# Patient Record
Sex: MALE | HISPANIC OR LATINO | Employment: FULL TIME | URBAN - METROPOLITAN AREA
[De-identification: names, ages, dates, MRNs, and addresses within clinical notes are randomized per-mention and may not be internally consistent; named-entity substitution may affect disease eponyms.]

---

## 2017-12-20 ENCOUNTER — HOSPITAL ENCOUNTER (EMERGENCY)
Facility: MEDICAL CENTER | Age: 37
End: 2017-12-20
Attending: EMERGENCY MEDICINE
Payer: COMMERCIAL

## 2017-12-20 VITALS
SYSTOLIC BLOOD PRESSURE: 132 MMHG | HEIGHT: 72 IN | OXYGEN SATURATION: 98 % | HEART RATE: 72 BPM | BODY MASS INDEX: 34.67 KG/M2 | WEIGHT: 255.95 LBS | RESPIRATION RATE: 16 BRPM | TEMPERATURE: 98.2 F | DIASTOLIC BLOOD PRESSURE: 70 MMHG

## 2017-12-20 DIAGNOSIS — S05.01XA ABRASION OF RIGHT CORNEA, INITIAL ENCOUNTER: ICD-10-CM

## 2017-12-20 PROCEDURE — 99283 EMERGENCY DEPT VISIT LOW MDM: CPT

## 2017-12-20 RX ORDER — POLYMYXIN B SULFATE AND TRIMETHOPRIM 1; 10000 MG/ML; [USP'U]/ML
1 SOLUTION OPHTHALMIC EVERY 4 HOURS
Qty: 10 ML | Refills: 1 | Status: SHIPPED | OUTPATIENT
Start: 2017-12-20 | End: 2017-12-23

## 2017-12-20 RX ORDER — MINERAL OIL, PETROLATUM 425; 568 MG/G; MG/G
OINTMENT OPHTHALMIC
Qty: 1 TUBE | Refills: 3 | Status: SHIPPED | OUTPATIENT
Start: 2017-12-20 | End: 2021-01-13

## 2017-12-20 ASSESSMENT — PAIN SCALES - GENERAL: PAINLEVEL_OUTOF10: 0

## 2017-12-20 NOTE — ED NOTES
Pt c/o R eye pain since yesterday. 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 Pt states since then it feels like something is in his eye. +photophobia. +redness/clear drainage noted to R eye.     A/ox4, speaking in full sentences.

## 2017-12-20 NOTE — DISCHARGE INSTRUCTIONS
Corneal Abrasion  The cornea is the clear covering at the front and center of the eye. When looking at the colored portion of the eye (iris), you are looking through the cornea. This very thin tissue is made up of many layers. The surface layer is a single layer of cells (corneal epithelium) and is one of the most sensitive tissues in the body. If a scratch or injury causes the corneal epithelium to come off, it is called a corneal abrasion. If the injury extends to the tissues below the epithelium, the condition is called a corneal ulcer.  CAUSES   · Scratches.  · Trauma.  · Foreign body in the eye.  Some people have recurrences of abrasions in the area of the original injury even after it has healed (recurrent erosion syndrome). Recurrent erosion syndrome generally improves and goes away with time.  SYMPTOMS   · Eye pain.  · Difficulty or inability to keep the injured eye open.  · The eye becomes very sensitive to light.  · Recurrent erosions tend to happen suddenly, first thing in the morning, usually after waking up and opening the eye.  DIAGNOSIS   Your health care provider can diagnose a corneal abrasion during an eye exam. Dye is usually placed in the eye using a drop or a small paper strip moistened by your tears. When the eye is examined with a special light, the abrasion shows up clearly because of the dye.  TREATMENT   · Small abrasions may be treated with antibiotic drops or ointment alone.  · A pressure patch may be put over the eye. If this is done, follow your doctor's instructions for when to remove the patch. Do not drive or use machines while the eye patch is on. Judging distances is hard to do with a patch on.  If the abrasion becomes infected and spreads to the deeper tissues of the cornea, a corneal ulcer can result. This is serious because it can cause corneal scarring. Corneal scars interfere with light passing through the cornea and cause a loss of vision in the involved eye.  HOME CARE  INSTRUCTIONS  · Use medicine or ointment as directed. Only take over-the-counter or prescription medicines for pain, discomfort, or fever as directed by your health care provider.  · Do not drive or operate machinery if your eye is patched. Your ability to  distances is impaired.  · If your health care provider has given you a follow-up appointment, it is very important to keep that appointment. Not keeping the appointment could result in a severe eye infection or permanent loss of vision. If there is any problem keeping the appointment, let your health care provider know.  SEEK MEDICAL CARE IF:   · You have pain, light sensitivity, and a scratchy feeling in one eye or both eyes.  · Your pressure patch keeps loosening up, and you can blink your eye under the patch after treatment.  · Any kind of discharge develops from the eye after treatment or if the lids stick together in the morning.  · You have the same symptoms in the morning as you did with the original abrasion days, weeks, or months after the abrasion healed.  MAKE SURE YOU:   · Understand these instructions.  · Will watch your condition.  · Will get help right away if you are not doing well or get worse.     This information is not intended to replace advice given to you by your health care provider. Make sure you discuss any questions you have with your health care provider.     Document Released: 12/15/2001 Document Revised: 12/23/2014 Document Reviewed: 08/25/2014  [employer] Patient Education ©2016 [employer]

## 2017-12-20 NOTE — ED NOTES
.  Chief Complaint   Patient presents with   • Eye Pain     right eye injury   pt was hit in eye by power cable while working today. Pt reports pain and bleeding to inner eyelid at time of injury. sensitive to light. Pt denies vision changes, just painful and dryness to right eye. Injury occurred yesterday

## 2017-12-20 NOTE — ED PROVIDER NOTES
"ED Provider Note    CHIEF COMPLAINT  Chief Complaint   Patient presents with   • Eye Pain     right eye injury       HPI  Gomez Newman is a 37 y.o. male who presentsWith increased tearing, pain and blurred vision to the right eye.  Yesterday at work was unplugging an electrical cord which swung up striking him in the eye.  Slight bleeding initially from his eyelid stopped yesterday.  The blurred vision today has improved.  Visual acuity is 20/40 in his right eye, the affected eye, and 20/50 in the other.  He states normally his right eye is his \"good eye\".  Patient states this is a work-related injury    REVIEW OF SYSTEMS  Constitutional: No fever  Ear nose throat: No facial pain  Eyes right eye irritation, improving vision, tearing       PAST MEDICAL HISTORY  Past Medical History:   Diagnosis Date   • Cold    • Diabetes 12/09   • Diabetes 2010    Dr. Irizarry- type 2   • Heart burn    • Seizure (CMS-HCC)     as an infant       FAMILY HISTORY  Family History   Problem Relation Age of Onset   • Diabetes Paternal Grandfather    • Diabetes Mother    • Diabetes Father    • Diabetes Brother    • Diabetes Brother    • Diabetes Maternal Grandmother        SOCIAL HISTORY  Social History     Social History   • Marital status: Single     Spouse name: N/A   • Number of children: N/A   • Years of education: N/A     Occupational History   • [employer]     Social History Main Topics   • Smoking status: Current Every Day Smoker     Types: Cigarettes   • Smokeless tobacco: Never Used      Comment: 2 daily   • Alcohol use No   • Drug use: No      Comment: marajuana   • Sexual activity: Not on file     Other Topics Concern   • Not on file     Social History Narrative    ** Merged History Encounter **         , 2 sons       SURGICAL HISTORY  Past Surgical History:   Procedure Laterality Date   • CIRCUMCISION ADULT  1/12/2010    Performed by RYAN KRUEGER at SURGERY [facility] ORS   • OTHER  2010    adult " circumcision       CURRENT MEDICATIONS  Home Medications     Reviewed by Selma Diamond R.N. (Registered Nurse) on 12/20/17 at 1358  Med List Status: Partial   Medication Last Dose Status   Blood Glucose Monitoring Suppl SUPPLIES MISC not taking Active   cyclobenzaprine (FLEXERIL) 5 MG tablet not taking Active   glucose monitoring kit (FREESTYLE) monitoring kit not taking Active   Lancets MISC not taking Active   metformin (GLUCOPHAGE) 1000 MG tablet not taking Active                ALLERGIES  No Known Allergies    PHYSICAL EXAM  VITAL SIGNS: /74   Pulse 77   Temp 36.6 °C (97.9 °F) (Temporal)   Resp 12   Ht 1.829 m (6')   Wt 116.1 kg (255 lb 15.3 oz)   SpO2 98%   BMI 34.71 kg/m²   Constitutional: Non-toxic appearance.   ENT: Oropharynx moist, nares clear, Nose normal.   Eyes: Lids and lashes normal.  Slit-lamp exam reveals no hyphema, no scleral bruising.  Under fluoroscopy  stain , slit lamp reveals a corneal abrasion at 7 o'clock which corresponds to the patient's area of discomfort  Cardiovascular: Regular rate and rhythm, No murmurs.   Pulmonary: Normal breath sounds.      COURSE & MEDICAL DECISION MAKING  Pertinent Labs & Imaging studies reviewed. (See chart for details)  Patient placed on lubricating eye ointment at his request.  He is placed on 3 day course of antibiotic drops.  He is referred back to Worker's Comp. for medical clearance to resume work and has been provided referral to ophthalmologist if needed or requested by Worker's Comp.    FINAL IMPRESSION  1. Abrasion of right cornea, initial encounter         Electronically signed by: Wu Levin, 12/20/2017 2:20 PM

## 2017-12-20 NOTE — LETTER
FORM C-4:  EMPLOYEE’S CLAIM FOR COMPENSATION/ REPORT OF INITIAL TREATMENT  EMPLOYEE’S CLAIM - PROVIDE ALL INFORMATION REQUESTED   First Name  Gomez Last Name  Trent Birthdate             Age  1980 37 y.o. 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 (if applicable)  cabel wire marrigment    How did this injury or occupational disease occur? Be specific and answer in detail. Use additional sheet if necessary)  I was removing a power cord that was ran through wire conduic and as I pulled it the end of cord whipped back and struck me int the right eye.   If you believe that you have an occupational disease, when did you first have knowledge of the disability and it relationship to your employment?  n/a Witnesses to the Accident  [place]     Nature of Injury or Occupational Disease  Contusion  Part(s) of Body Injured or Affected  Eye (R), N/A, N/A    I certify that the above is true and correct to the best of my knowledge and that I have  provided this information in order to obtain the benefits of [place]  I hereby authorize any physician, chiropractor, surgeon, practitioner, or other person, any hospital, including [facility] or [facility] hospital, any medical service organization, any insurance company, or other institution or organization to release to each other, any medical or other information, including benefits paid or payable, pertinent to this injury or disease, except information relative to diagnosis, treatment and/or counseling for AIDS, psychological conditions, alcohol or controlled substances, for which I must give specific authorization.  A Photostat of this authorization shall be as valid as the original.   Date Place   Employee’s Signature   THIS REPORT MUST BE COMPLETED AND MAILED WITHIN 3 WORKING DAYS OF TREATMENT   Place  [facility], EMERGENCY DEPT  Name of Facility   [facility]   Date  12/20/2017 Diagnosis  (S05.01XA) Abrasion of right cornea, initial encounter Is there evidence the injured employee was under the influence of alcohol and/or another controlled substance at the time of accident?   Hour  2:48 PM Description of Injury or Disease  Abrasion of right cornea, initial encounter     Treatment     Have you advised the patient to remain off work five days or more?             X-Ray Findings      If Yes   From Date    To Date      From information given by the employee, together with medical evidence, can you directly connect this injury or occupational disease as job incurred?    If No, is the employee capable of: Full Duty    Modified Duty      Is additional medical care by a physician indicated?    If Modified Duty, Specify any Limitations / Restrictions        Do you know of any previous injury or disease contributing to this condition or occupational disease?    "   Date  12/20/2017 Print Doctor’s Name  Wu Levin MUNA certify the employer’s copy of this form was mailed on:   Address  [address] [address] [phone] Insurer’s Use Only   [facility]  [address]    Provider’s Tax ID Number    Telephone  Dept: [phone]    Doctor’s Signature    Degree       Original - TREATING PHYSICIAN OR CHIROPRACTOR   Pg 2-Insurer/TPA   Pg 3-Employer   Pg 4-Employee                                                                                                  Form C-4 (rev01/03)     BRIEF DESCRIPTION OF RIGHTS AND BENEFITS  (Pursuant to [place] 616[place])    Notice of Injury or Occupational Disease (Incident Report Form C-1): If an injury or occupational disease (OD) arises out of and in the course of employment, you must provide written notice to your employer as soon as practicable, but no later than 7 days after the accident or OD. Your employer shall maintain a sufficient supply of the required forms.    Claim for Compensation (Form C-4): If medical treatment is sought, the form C-4 is available at the place of initial treatment. A completed \"Claim for Compensation\" (Form C-4) must be filed within 90 days after an accident or OD. The treating physician or chiropractor must, within 3 working days after treatment, complete and mail to the employer, the employer's insurer and third-party , the Claim for Compensation.    Medical Treatment: If you require medical treatment for your on-the-job injury or OD, you may be required to select a physician or chiropractor from a list provided by your workers’ compensation insurer, if it has contracted with an Organization for Managed Care (MCO) or Preferred Provider Organization (PPO) or providers of health care. If your employer has not entered into a contract with an MCO or PPO, you may select a physician or chiropractor from the Panel of Physicians and Chiropractors. Any medical costs related " to your industrial injury or OD will be paid by your insurer.    Temporary Total Disability (TTD): If your doctor has certified that you are unable to work for a period of at least 5 consecutive days, or 5 cumulative days in a 20-day period, or places restrictions on you that your employer does not accommodate, you may be entitled to TTD compensation.    Temporary Partial Disability (TPD): If the wage you receive upon reemployment is less than the compensation for TTD to which you are entitled, the insurer may be required to pay you TPD compensation to make up the difference. TPD can only be paid for a maximum of 24 months.    Permanent Partial Disability (PPD): When your medical condition is stable and there is an indication of a PPD as a result of your injury or OD, within 30 days, your insurer must arrange for an evaluation by a rating physician or chiropractor to determine the degree of your PPD. The amount of your PPD award depends on the date of injury, the results of the PPD evaluation and your age and wage.    Permanent Total Disability (PTD): If you are medically certified by a treating physician or chiropractor as permanently and totally disabled and have been granted a PTD status by your insurer, you are entitled to receive monthly benefits not to exceed 66 2/3% of your average monthly wage. The amount of your PTD payments is subject to reduction if you previously received a PPD award.    Vocational Rehabilitation Services: You may be eligible for vocational rehabilitation services if you are unable to return to the job due to a permanent physical impairment or permanent restrictions as a result of your injury or occupational disease.    Transportation and Per [place] Reimbursement: You may be eligible for travel expenses and per [place] associated with medical treatment.  Reopening: You may be able to reopen your claim if your condition worsens after claim closure.    Appeal Process: If you disagree with a  written determination issued by the insurer or the insurer does not respond to your request, you may appeal to the Department of Administration, , by following the instructions contained in your determination letter. You must appeal the determination within 70 days from the date of the determination letter at [address], [address], [place], [place] [address], or [address], [address], [place], [place] [address]. If you disagree with the  decision, you may appeal to the Department of Administration, . You must file your appeal within 30 days from the date of the  decision letter at [address], [address], [place], [place] [address], or [address], [address], [place], [place] [address]. If you disagree with a decision of an , you may file a petition for judicial review with the District Court. You must do so within 30 days of the Appeal Officer’s decision. You may be represented by an  at your own expense or you may contact the [facility] for possible representation.    [place]  for Injured Workers (NAIW): If you disagree with a  decision, you may request that NAIW represent you without charge at an  Hearing. For information regarding denial of benefits, you may contact the [facility] at: [address], [address], [place] [address], [phone], or [address], [place] [address], [phone]    To File a Complaint with the Division: If you wish to file a complaint with the  of the Division of Industrial Relations (DIR), please contact the Workers’ Compensation Section, [address], [address], [place], [place] [address], telephone [phone]) [phone], or [address], [address], [place], [place] [address], telephone [phone].    For assistance with Workers’ Compensation Issues: you may contact the  Office of the Governor Consumer Health Assistance, [address] [place] [address], [address], [place] [address], Toll Free 1-795.840.8313, Web site: http://govcha.[facility].nv., E-mail [email or web address].                                                                                                                                                                               __________________________________________________________________                                    _________________            Employee Name / Signature                                                                                                                            Date                                       D-2 (rev. 10/07)

## 2017-12-20 NOTE — LETTER
FORM C-4:  EMPLOYEE’S CLAIM FOR COMPENSATION/ REPORT OF INITIAL TREATMENT  EMPLOYEE’S CLAIM - PROVIDE ALL INFORMATION REQUESTED   First Name  Gomez Last Name  Trent Birthdate             Age  1980 37 y.o. 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 (if applicable)  cabel wire marrigment    How did this injury or occupational disease occur? Be specific and answer in detail. Use additional sheet if necessary)  I was removing a power cord that was ran through wire conduic and as I pulled it the end of cord whipped back and struck me int the right eye.   If you believe that you have an occupational disease, when did you first have knowledge of the disability and it relationship to your employment?  n/a Witnesses to the Accident  Chepe Kuna     Nature of Injury or Occupational Disease  Contusion  Part(s) of Body Injured or Affected  Eye (R), N/A, N/A    I certify that the above is true and correct to the best of my  knowledge and that I have provided this information in order to obtain the benefits of [place]).  I hereby authorize any physician, chiropractor, surgeon, practitioner, or other person, any hospital, including [facility] or [facility], any medical service organization, any insurance company, or other institution or organization to release to each other, any medical or other information, including benefits paid or payable, pertinent to this injury or disease, except information relative to diagnosis, treatment and/or counseling for AIDS, psychological conditions, alcohol or controlled substances, for which I must give specific authorization.  A Photostat of this authorization shall be as valid as the original.   Date  12/20/2017 [facility]   Employee’s Signature   THIS REPORT MUST BE COMPLETED AND MAILED WITHIN 3 WORKING DAYS OF TREATMENT   Place  [facility], EMERGENCY DEPT  Name of Facility   [facility]   Date  12/20/2017 Diagnosis  (S05.01XA) Abrasion of right cornea, initial encounter Is there evidence the injured employee was under the influence of alcohol and/or another controlled substance at the time of accident?   Hour  2:55 PM Description of Injury or Disease  Abrasion of right cornea, initial encounter No   Treatment  Eye lubrication, antibiotic eyedrops  Have you advised the patient to remain off work five days or more?         No   X-Ray Findings      If Yes   From Date    To Date      From information given by the employee, together with medical evidence, can you directly connect this injury or occupational disease as job incurred?  Yes If No, is the employee capable of: Full Duty  No Modified Duty  Yes   Is additional medical care by a physician indicated?  Yes  Comments:occupational medicine, ophthalmology if needed If  "Modified Duty, Specify any Limitations / Restrictions  Follow-up with Worker's Comp. doctor tomorrow for clearance to resume normal activity.  Patient has also been provided with referral to eye doctor if needed     Do you know of any previous injury or disease contributing to this condition or occupational disease?  No   Date  12/20/2017 Print Doctor’s Name  Wu Levin certify the employer’s copy of this form was mailed on:   Address  [address] [place] [address] [phone] Insurer’s Use Only   [facility]  [address]    Provider’s Tax ID Number  [id number] Telephone  Dept: [phone]    Doctor’s Signature  e-WU Angel M.D. Degree  M.D.    Original - TREATING PHYSICIAN OR CHIROPRACTOR   Pg 2-Insurer/TPA   Pg 3-Employer   Pg 4-Employee                                                                                                  Form C-4 (rev01/03)     BRIEF DESCRIPTION OF RIGHTS AND BENEFITS  (Pursuant to [place] 616C.050)    Notice of Injury or Occupational Disease (Incident Report Form C-1): If an injury or occupational disease (OD) arises out of and in the course of employment, you must provide written notice to your employer as soon as practicable, but no later than 7 days after the accident or OD. Your employer shall maintain a sufficient supply of the required forms.    Claim for Compensation (Form C-4): If medical treatment is sought, the form C-4 is available at the place of initial treatment. A completed \"Claim for Compensation\" (Form C-4) must be filed within 90 days after an accident or OD. The treating physician or chiropractor must, within 3 working days after treatment, complete and mail to the employer, the employer's insurer and third-party , the Claim for Compensation.    Medical Treatment: If you require medical treatment for your on-the-job injury or OD, you may be required to select a physician or chiropractor from a list provided " by your workers’ compensation insurer, if it has contracted with an Organization for Managed Care (MCO) or Preferred Provider Organization (PPO) or providers of health care. If your employer has not entered into a contract with an MCO or PPO, you may select a physician or chiropractor from the Panel of Physicians and Chiropractors. Any medical costs related to your industrial injury or OD will be paid by your insurer.    Temporary Total Disability (TTD): If your doctor has certified that you are unable to work for a period of at least 5 consecutive days, or 5 cumulative days in a 20-day period, or places restrictions on you that your employer does not accommodate, you may be entitled to TTD compensation.    Temporary Partial Disability (TPD): If the wage you receive upon reemployment is less than the compensation for TTD to which you are entitled, the insurer may be required to pay you TPD compensation to make up the difference. TPD can only be paid for a maximum of 24 months.    Permanent Partial Disability (PPD): When your medical condition is stable and there is an indication of a PPD as a result of your injury or OD, within 30 days, your insurer must arrange for an evaluation by a rating physician or chiropractor to determine the degree of your PPD. The amount of your PPD award depends on the date of injury, the results of the PPD evaluation and your age and wage.    Permanent Total Disability (PTD): If you are medically certified by a treating physician or chiropractor as permanently and totally disabled and have been granted a PTD status by your insurer, you are entitled to receive monthly benefits not to exceed 66 2/3% of your average monthly wage. The amount of your PTD payments is subject to reduction if you previously received a PPD award.    Vocational Rehabilitation Services: You may be eligible for vocational rehabilitation services if you are unable to return to the job due to a permanent physical  impairment or permanent restrictions as a result of your injury or occupational disease.    Transportation and Per [place] Reimbursement: You may be eligible for travel expenses and per [place] associated with medical treatment.  Reopening: You may be able to reopen your claim if your condition worsens after claim closure.    Appeal Process: If you disagree with a written determination issued by the insurer or the insurer does not respond to your request, you may appeal to the Department of Administration, , by following the instructions contained in your determination letter. You must appeal the determination within 70 days from the date of the determination letter at [address], [address], [place], [place] [address], or [address], [address], [place] [address]. If you disagree with the  decision, you may appeal to the Department of Administration, . You must file your appeal within 30 days from the date of the  decision letter at [address], [address], [place], [place] [address], or [address], [address], [place] [address]. If you disagree with a decision of an , you may file a petition for judicial review with the District Court. You must do so within 30 days of the Appeal Officer’s decision. You may be represented by an  at your own expense or you may contact the [facility] for possible representation.    [place]  for Injured Workers (NAIW): If you disagree with a  decision, you may request that NAIW represent you without charge at an  Hearing. For information regarding denial of benefits, you may contact the [facility] at: [address], [address], [place] [address], [phone], or [address], [address], [place] [address], [phone]    To File a Complaint with the Division: If you wish to file a complaint with the   of the Division of Industrial Relations (DIR), please contact the Workers’ Compensation Section, [address], [address], [place], [place] [address], telephone [phone], or [address], [address], [place], [place] [address], telephone [phone].    For assistance with Workers’ Compensation Issues: you may contact the Office of the Governor Consumer Health Assistance, [address] [place] [address], [address], [place], [place] [address], Toll Free 1-679.945.3952, Web site: [email or web address]., E-mail [email or web address]                                                                                                                                                                               __________________________________________________________________                                    _12/20/2017_            Employee Name / Signature                                                                                                                            Date                                       D-2 (rev. 10/07)

## 2017-12-21 ENCOUNTER — OCCUPATIONAL MEDICINE (OUTPATIENT)
Dept: OCCUPATIONAL MEDICINE | Facility: CLINIC | Age: 37
End: 2017-12-21
Payer: COMMERCIAL

## 2017-12-21 VITALS
BODY MASS INDEX: 36.3 KG/M2 | WEIGHT: 268 LBS | OXYGEN SATURATION: 94 % | RESPIRATION RATE: 16 BRPM | TEMPERATURE: 97 F | HEIGHT: 72 IN | SYSTOLIC BLOOD PRESSURE: 112 MMHG | HEART RATE: 84 BPM | DIASTOLIC BLOOD PRESSURE: 68 MMHG

## 2017-12-21 DIAGNOSIS — S05.01XA ABRASION OF RIGHT CORNEA, INITIAL ENCOUNTER: ICD-10-CM

## 2017-12-21 PROCEDURE — 99202 OFFICE O/P NEW SF 15 MIN: CPT | Performed by: PREVENTIVE MEDICINE

## 2017-12-21 NOTE — LETTER
[address],   [address] [address]  Phone:  [phone] - Fax:  [phone]   [facility] Progress Report and Disability Certification  Date of Service: 12/21/2017   No Show:  No  Date / Time of Next Visit:  Discharged   Claim Information   Patient Name: Gomez Newman  Claim Number:     Employer:   [employer] Date of Injury:      Insurer / TPA: Misc Workers Comp  ID / SSN:     Occupation:  Facilities Lead Diagnosis: The encounter diagnosis was Abrasion of right cornea, initial encounter.    Medical Information   Related to Industrial Injury? Yes    Subjective Complaints:  DOI 12/19/17: 36 yo male presents for right eye injury. He was at work unplugging an electrical cord which swung up striking him in the right eye. He was seen the next day in the ED, diagnosed with corneal abrasion and given polytrim drops. Patient states that overall symptoms have been improving. He notes just a little bit of watery eyes and some photosensitivity. He's been using the Polytrim drops and lubricating eyedrop.   Objective Findings: Eyes: PERRLA. EOMI. No conjunctival erythema. Funduscopic exam normal.     Pre-Existing Condition(s):     Assessment:   Condition Improved    Status: Discharged /  MMI  Permanent Disability:No    Plan:      Diagnostics:      Comments:  Continue eye drops for two more days  Release from care  Full duty  Expect complete resolution of symptoms within the week, if symptoms do not resolve return to clinic    Disability Information   Status: Released to Full Duty    From:  12/21/2017  Through:   Restrictions are:     Physical Restrictions   Sitting:    Standing:    Stooping:    Bending:      Squatting:    Walking:    Climbing:    Pushing:      Pulling:    Other:    Reaching Above Shoulder (L):   Reaching Above Shoulder (R):       Reaching Below Shoulder (L):    Reaching Below Shoulder (R):      Not to exceed Weight Limits      Carrying(hrs):   Weight Limit(lb):   Lifting(hrs):   Weight  Limit(lb):     Comments:      Repetitive Actions   Hands: i.e. Fine Manipulations from Grasping:     Feet: i.e. Operating Foot Controls:     Driving / Operate Machinery:     Physician Name: Bairon Sweet D.O. Physician Signature: savannahSignTAYLBAIRON ALEJO D.O. e-Signature: Dr. Bakari Segura, Medical Director   Clinic Name / Location: [address],   [address] [address] Clinic Phone Number: Dept: [phone]   Appointment Time: 8:30 Am Visit Start Time: 8:42 AM   Check-In Time:  8:36 Am Visit Discharge Time: 9:12 AM   Original-Treating Physician or Chiropractor    Page 2-Insurer/TPA    Page 3-Employer    Page 4-Employee

## 2017-12-21 NOTE — PROGRESS NOTES
Subjective:      Gomez Newman is a 37 y.o. male who presents with Follow-Up (WC DOI 12/19/17 Rt eye feeling better room pr 1)      DOI 12/19/17: 38 yo male presents for right eye injury. He was at work unplugging an electrical cord which swung up striking him in the right eye. He was seen the next day in the ED, diagnosed with corneal abrasion and given polytrim drops. Patient states that overall symptoms have been improving. He notes just a little bit of watery eyes and some photosensitivity. He's been using the Polytrim drops and lubricating eyedrop.     HPI    ROS  ROS: All systems were reviewed on intake form, form was reviewed and signed. See scanned documents in media. Pertinent positives and negatives included in HPI.    PMH: No pertinent past medical history to this problem  MEDS: Medications were reviewed in Epic  ALLERGIES: No Known Allergies  SOCHX: Works as facilities lead  FH: No pertinent family history to this problem     Objective:     /68   Pulse 84   Temp 36.1 °C (97 °F)   Resp 16   Ht 1.829 m (6')   Wt 121.6 kg (268 lb)   SpO2 94%   BMI 36.35 kg/m²      Physical Exam   Constitutional: He is oriented to person, place, and time. He appears well-developed and well-nourished.   Cardiovascular: Normal rate.    Pulmonary/Chest: Effort normal. No respiratory distress.   Neurological: He is alert and oriented to person, place, and time.   Skin: Skin is warm and dry.   Psychiatric: He has a normal mood and affect. Judgment normal.       Eyes: PERRLA. EOMI. No conjunctival erythema. Funduscopic exam normal.         Assessment/Plan:     1. Abrasion of right cornea, initial encounter  Continue eye drops for two more days  Release from care  Full duty  Expect complete resolution of symptoms within the week, if symptoms do not resolve return to clinic

## 2019-05-15 ENCOUNTER — APPOINTMENT (OUTPATIENT)
Dept: RADIOLOGY | Facility: MEDICAL CENTER | Age: 39
End: 2019-05-15
Attending: EMERGENCY MEDICINE
Payer: COMMERCIAL

## 2019-05-15 ENCOUNTER — HOSPITAL ENCOUNTER (EMERGENCY)
Facility: MEDICAL CENTER | Age: 39
End: 2019-05-15
Attending: EMERGENCY MEDICINE
Payer: COMMERCIAL

## 2019-05-15 VITALS
RESPIRATION RATE: 16 BRPM | TEMPERATURE: 98.2 F | HEIGHT: 72 IN | BODY MASS INDEX: 31.83 KG/M2 | HEART RATE: 85 BPM | SYSTOLIC BLOOD PRESSURE: 129 MMHG | DIASTOLIC BLOOD PRESSURE: 83 MMHG | OXYGEN SATURATION: 97 % | WEIGHT: 235 LBS
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
## 2019-05-15 DIAGNOSIS — S00.81XA ABRASION OF FOREHEAD, INITIAL ENCOUNTER: ICD-10-CM

## 2019-05-15 PROCEDURE — 305948 HCHG GREEN TRAUMA ACT PRE-NOTIFY NO CC

## 2019-05-15 PROCEDURE — 99284 EMERGENCY DEPT VISIT MOD MDM: CPT
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
## 2019-05-28 ENCOUNTER — HOSPITAL ENCOUNTER (OUTPATIENT)
Dept: HOSPITAL 8 - CFH | Age: 39
Discharge: HOME | End: 2019-05-28
Attending: FAMILY MEDICINE
Payer: COMMERCIAL

## 2019-05-28 DIAGNOSIS — H57.11: ICD-10-CM
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
## 2019-05-28 DIAGNOSIS — M47.816: Primary | ICD-10-CM

## 2019-05-28 DIAGNOSIS — M54.2: ICD-10-CM

## 2019-05-28 PROCEDURE — 72050 X-RAY EXAM NECK SPINE 4/5VWS: CPT

## 2019-05-28 PROCEDURE — 70250 X-RAY EXAM OF SKULL: CPT

## 2019-05-28 PROCEDURE — 72114 X-RAY EXAM L-S SPINE BENDING: CPT

## 2020-01-20 ENCOUNTER — OFFICE VISIT (OUTPATIENT)
Dept: URGENT CARE | Facility: URGENT CARE | Age: 40
End: 2020-01-20
Payer: COMMERCIAL

## 2020-01-20 VITALS
TEMPERATURE: 99.3 F | HEIGHT: 72 IN | BODY MASS INDEX: 33.21 KG/M2 | OXYGEN SATURATION: 99 % | DIASTOLIC BLOOD PRESSURE: 83 MMHG | HEART RATE: 85 BPM | SYSTOLIC BLOOD PRESSURE: 132 MMHG | RESPIRATION RATE: 20 BRPM | WEIGHT: 245.2 LBS

## 2020-01-20 DIAGNOSIS — R52 BODY ACHES: ICD-10-CM

## 2020-01-20 DIAGNOSIS — R50.9 FEVER AND CHILLS: Primary | ICD-10-CM

## 2020-01-20 DIAGNOSIS — R05.9 COUGH: ICD-10-CM

## 2020-01-20 DIAGNOSIS — J10.1 INFLUENZA A: ICD-10-CM

## 2020-01-20 LAB
FLUAV+FLUBV AG SPEC QL: NEGATIVE
FLUAV+FLUBV AG SPEC QL: POSITIVE
SPECIMEN SOURCE: ABNORMAL

## 2020-01-20 PROCEDURE — 99202 OFFICE O/P NEW SF 15 MIN: CPT | Performed by: PHYSICIAN ASSISTANT

## 2020-01-20 PROCEDURE — 87804 INFLUENZA ASSAY W/OPTIC: CPT | Performed by: FAMILY MEDICINE

## 2020-01-20 RX ORDER — IBUPROFEN 800 MG/1
800 TABLET, FILM COATED ORAL EVERY 8 HOURS PRN
Qty: 30 TABLET | Refills: 0 | Status: SHIPPED | OUTPATIENT
Start: 2020-01-20 | End: 2021-01-19

## 2020-01-20 RX ORDER — CODEINE PHOSPHATE AND GUAIFENESIN 10; 100 MG/5ML; MG/5ML
5-10 SOLUTION ORAL
Qty: 120 ML | Refills: 0 | Status: SHIPPED | OUTPATIENT
Start: 2020-01-20

## 2020-01-20 RX ORDER — OSELTAMIVIR PHOSPHATE 75 MG/1
75 CAPSULE ORAL 2 TIMES DAILY
Qty: 10 CAPSULE | Refills: 0 | Status: SHIPPED | OUTPATIENT
Start: 2020-01-20

## 2020-01-20 ASSESSMENT — MIFFLIN-ST. JEOR: SCORE: 2070.22

## 2020-01-20 NOTE — PROGRESS NOTES
SUBJECTIVE:   Boy Silva is a 40 year old male presenting with a chief complaint of fever, chills, runny nose, stuffy nose, cough - non-productive and body aches.  Onset of symptoms was 2 day(s) ago.  Course of illness is same.    Severity moderate  Current and Associated symptoms: fever, chills, stuffy nose, cough - non-productive and body aches  Treatment measures tried include OTC Cough med.  Predisposing factors include recent illness .    PMH  No pert med hx    ALLERGIES   No Known Allergies      Social History     Tobacco Use     Smoking status: Never Smoker     Smokeless tobacco: Never Used   Substance Use Topics     Alcohol use: Not on file     Family Hx  HTN  Obesity      ROS:  CONSTITUTIONAL:POSITIVE  for fever   INTEGUMENTARY/SKIN: NEGATIVE for worrisome rashes, moles or lesions  EYES: NEGATIVE for vision changes or irritation  ENT/MOUTH: POSITIVE for nasal congestion  RESP:POSITIVE for cough-non productive  CV: NEGATIVE for chest pain, palpitations or peripheral edema  GI: NEGATIVE for nausea, abdominal pain, heartburn, or change in bowel habits  : negative for and dysuria  MUSCULOSKELETAL: POSITIVE  for body aches  NEURO: NEGATIVE for weakness, dizziness or paresthesias    OBJECTIVE  :/83   Pulse 85   Temp 99.3  F (37.4  C) (Oral)   Resp 20   Ht 1.829 m (6')   Wt 111.2 kg (245 lb 3.2 oz)   SpO2 99%   BMI 33.26 kg/m    GENERAL APPEARANCE: healthy, alert and no distress  EYES: EOMI,  PERRL, conjunctiva clear  HENT: TM's normal bilaterally and rhinorrhea clear  NECK: supple, nontender, no lymphadenopathy  RESP: lungs clear to auscultation - no rales, rhonchi or wheezes  CV: regular rates and rhythm, normal S1 S2, no murmur noted  ABDOMEN:  soft, nontender, no HSM or masses and bowel sounds normal  Extremities: no peripheral edema or tenderness, peripheral pulses normal  MS: extremities normal- no gross deformities noted, no erythema, FROM noted in all extremities  NEURO: Normal strength  and tone, sensory exam grossly normal,  normal speech and mentation  SKIN: no suspicious lesions or rashes    Results for orders placed or performed in visit on 01/20/20   Influenza A/B antigen     Status: Abnormal   Result Value Ref Range    Influenza A/B Agn Specimen Nasal     Influenza A Positive (A) NEG^Negative    Influenza B Negative NEG^Negative       ASSESSMENT/PLAN:      ICD-10-CM    1. Fever and chills R50.9 Influenza A/B antigen     ibuprofen (ADVIL/MOTRIN) 800 MG tablet   2. Cough R05 guaiFENesin-codeine (ROBITUSSIN AC) 100-10 MG/5ML solution   3. Body aches R52 ibuprofen (ADVIL/MOTRIN) 800 MG tablet   4. Influenza A J10.1 oseltamivir (TAMIFLU) 75 MG capsule       Orders Placed This Encounter     oseltamivir (TAMIFLU) 75 MG capsule     guaiFENesin-codeine (ROBITUSSIN AC) 100-10 MG/5ML solution     ibuprofen (ADVIL/MOTRIN) 800 MG tablet       Patient given information about influenza.  Patient understands they are contagious until their fever has resolved without the use of motrin or tylenol.  At that time they can return to school/work.  Patient is to monitor for any worsening symptoms and return to the clinic if this occurs.  The most common complication of influenza is Pneumonia or other respiratory problems especially in those with underlying lung problems including asthma and COPD.  Patient will follow up if this occurs.  See orders in Epic

## 2020-01-20 NOTE — PATIENT INSTRUCTIONS
Patient Education     Influenza (Adult)    Influenza is also called the flu. It is a viral illness that affects the air passages of your lungs. It is different from the common cold. The flu can easily be passed from one to person to another. It may be spread through the air by coughing and sneezing. Or it can be spread by touching the sick person and then touching your own eyes, nose, or mouth.  The flu starts 1 to 3 days after you are exposed to the flu virus. It may last for 1 to 2 weeks but many people feel tired or fatigued for many weeks afterward. You usually don t need to take antibiotics unless you have a complication. This might be an ear or sinus infection or pneumonia.  Symptoms of the flu may be mild or severe. They can include extreme tiredness (wanting to stay in bed all day), chills, fevers, muscle aches, soreness with eye movement, headache, and a dry, hacking cough.  Home care  Follow these guidelines when caring for yourself at home:    Avoid being around cigarette smoke, whether yours or other people s.    Acetaminophen or ibuprofen will help ease your fever, muscle aches, and headache. Don t give aspirin to anyone younger than 18 who has the flu. Aspirin can harm the liver.    Nausea and loss of appetite are common with the flu. Eat light meals. Drink 6 to 8 glasses of liquids every day. Good choices are water, sport drinks, soft drinks without caffeine, juices, tea, and soup. Extra fluids will also help loosen secretions in your nose and lungs.    Over-the-counter cold medicines will not make the flu go away faster. But the medicines may help with coughing, sore throat, and congestion in your nose and sinuses. Don t use a decongestant if you have high blood pressure.    Stay home until your fever has been gone for at least 24 hours without using medicine to reduce fever.  Follow-up care  Follow up with your healthcare provider, or as advised, if you are not getting better over the next  week.  If you are age 65 or older, talk with your provider about getting a pneumococcal vaccine every 5 years. You should also get this vaccine if you have chronic asthma or COPD. All adults should get a flu vaccine every fall. Ask your provider about this.  When to seek medical advice  Call your healthcare provider right away if any of these occur:    Cough with lots of colored mucus (sputum) or blood in your mucus    Chest pain, shortness of breath, wheezing, or trouble breathing    Severe headache, or face, neck, or ear pain    New rash with fever    Fever of 100.4 F (38 C) or higher, or as directed by your healthcare provider    Confusion, behavior change, or seizure    Severe weakness or dizziness    You get a new fever or cough after getting better for a few days  Date Last Reviewed: 1/1/2017 2000-2019 [employer]. [address] [place] [address], [place] [address]. All rights reserved. This information is not intended as a substitute for professional medical care. Always follow your healthcare professional's instructions.

## 2021-01-07 ENCOUNTER — TELEPHONE (OUTPATIENT)
Dept: SCHEDULING | Facility: IMAGING CENTER | Age: 41
End: 2021-01-07

## 2021-01-08 ENCOUNTER — TELEPHONE (OUTPATIENT)
Dept: MEDICAL GROUP | Facility: PHYSICIAN GROUP | Age: 41
End: 2021-01-08

## 2021-01-08 NOTE — TELEPHONE ENCOUNTER
Phone Number Called: [phone] (home)       Call outcome: Phone number on file is not a working number.    Message: I was calling Pt to complete new Pt pvp. No working number on file.

## 2021-01-13 ENCOUNTER — OFFICE VISIT (OUTPATIENT)
Dept: MEDICAL GROUP | Facility: PHYSICIAN GROUP | Age: 41
End: 2021-01-13
Payer: COMMERCIAL

## 2021-01-13 VITALS
WEIGHT: 244 LBS | BODY MASS INDEX: 33.05 KG/M2 | SYSTOLIC BLOOD PRESSURE: 132 MMHG | TEMPERATURE: 98.9 F | OXYGEN SATURATION: 96 % | DIASTOLIC BLOOD PRESSURE: 72 MMHG | HEART RATE: 80 BPM | HEIGHT: 72 IN

## 2021-01-13 DIAGNOSIS — M25.561 ACUTE PAIN OF RIGHT KNEE: ICD-10-CM

## 2021-01-13 DIAGNOSIS — E11.9 TYPE 2 DIABETES MELLITUS WITHOUT COMPLICATION, WITHOUT LONG-TERM CURRENT USE OF INSULIN (HCC): Primary | ICD-10-CM

## 2021-01-13 LAB
HBA1C MFR BLD: 10.4 % (ref 0–5.6)
INT CON NEG: ABNORMAL
INT CON POS: ABNORMAL

## 2021-01-13 PROCEDURE — 99204 OFFICE O/P NEW MOD 45 MIN: CPT | Performed by: NURSE PRACTITIONER

## 2021-01-13 PROCEDURE — 83036 HEMOGLOBIN GLYCOSYLATED A1C: CPT | Performed by: NURSE PRACTITIONER

## 2021-01-13 RX ORDER — IBUPROFEN 800 MG/1
800 TABLET ORAL
COMMUNITY
Start: 2020-01-20 | End: 2021-01-13

## 2021-01-13 RX ORDER — LANCETS 30 GAUGE
EACH MISCELLANEOUS
Qty: 100 EACH | Refills: 11 | Status: SHIPPED | OUTPATIENT
Start: 2021-01-13

## 2021-01-13 ASSESSMENT — PATIENT HEALTH QUESTIONNAIRE - PHQ9: CLINICAL INTERPRETATION OF PHQ2 SCORE: 0

## 2021-01-13 NOTE — PROGRESS NOTES
Subjective  Chief Complaint  Establish care to manage his chronic conditions    History of Present Illness  Soham Newman is a 41 y.o. male. This patient is here today to establish care.  His prior PCP was Dr. Summers ([place]).    Type 2 diabetes mellitus without complication, without long-term current use of insulin ([facility])  This is a chronic condition.  Current medications:  none  Last A1c:  due  Last Microalb/Cr ratio:  due  Fasting sugars:  due  Last diabetic foot exam: due - next visit  Last retinal eye exam: due  ACEi/ARB?  Pending microalb/cr ratio; none  Statin? Lipid panel due  Aspirin?  Not applicable  Concomitant HTN?  No  Nightly foot checks?  Yes    He does not check his sugars at home.  He reports he stopped taking his blood sugars because he felt he had his sugars under control.  He denies polyuria, polydipsia, or unexpected weight loss, and he does complain of blurred vision.  He has tried to manage with diet and lifestyle prior today, and he had stopped checking his blood sugars and his previous metformin dose.  He reports he eats pretty well throughout the day, but at night he tends to eat more at night.     Acute pain of right knee  This is an acute pain.  He reports that he has had pain to his right knee for the last 3 weeks with swelling.  He reports that he has tried ibuprofen and ice application with minimal relief.  He reports that he had hurt his knee in high school but he was able to continue to run on his knee.  He denies recent trauma to the air.  He reports most of his pain is at night after work/walking on it (about a 3-4/10).  He is unable to bend his knee fully.  He reports that when he is walking, he feels that it gets caught at times and will need to massage the area.    Past Medical History    Allergies: Patient has no known allergies.  Past Medical History:   Diagnosis Date   • Cold    • Diabetes 12/09   • Diabetes 2010    Dr. Irizarry- type 2   • Diabetes ([facility])    • Heart burn     • Seizure (HCC)     as an infant     Past Surgical History:   Procedure Laterality Date   • CIRCUMCISION ADULT  1/12/2010    Performed by RYAN KRUEGER at SURGERY [facility]S   • OTHER  2010    adult circumcision     Current Outpatient Medications Ordered in Epic   Medication Sig Dispense Refill   • metFORMIN (GLUCOPHAGE) 500 MG Tab Take 1 Tab by mouth 2 times a day, with meals. 180 Tab 0   • Blood Glucose Test Strips Test strips order: Test strips for Glucometer ordered meter. Sig: check sugars daily #100 RF x 3 1 Each 0   • glucose monitoring kit (FREESTYLE) monitoring kit Check sugars daily 1 Kit 0   • Lancets Lancets order: Lancets for Glucometer ordered meter. Sig: Check sugars once a day. #100 RF x 3 100 Each 11     No current [facility]-ordered facility-administered medications on file.      Family History:    Family History   Problem Relation Age of Onset   • Diabetes Paternal Grandfather    • Diabetes Mother    • Diabetes Father    • Diabetes Brother    • Diabetes Brother    • Diabetes Maternal Grandmother       Personal/Social History:    Social History     Tobacco Use   • Smoking status: Former Smoker     Packs/day: 0.10     Years: 8.00     Pack years: 0.80     Types: Cigarettes   • Smokeless tobacco: Never Used   • Tobacco comment: 2 daily   Substance Use Topics   • Alcohol use: Yes   • Drug use: Yes     Comment: aminah     Social History     Social History Narrative    ** Merged History Encounter **         ** Merged History Encounter **         , 2 sons      Review of Systems:     General: Negative for fever/chills.    Eyes:  Positive blurry vision.   ENT:  Negative for hearing changes.    Respiratory:  Negative for cough.     Cardiovascular:  Negative for chest pain.   Gastrointestinal:  Negative for nausea/vomiting.    Genitourinary:  Negative for dysuria.    Musculoskeletal:  Positive right knee pain and swelling.    Skin:  Negative for rash.    Neurological:  Negative for  numbness/tingling.    Heme/Lymph:  Does not bruise/bleed easily.     Objective  Physical Exam:   /72 (BP Location: Right arm, Patient Position: Sitting, BP Cuff Size: Large adult)   Pulse 80   Temp 37.2 °C (98.9 °F) (Temporal)   Ht 1.829 m (6')   Wt 110.7 kg (244 lb)   SpO2 96%  Body mass index is 33.09 kg/m².  General:  Alert and oriented.  Well appearing.  NAD.  Head:  Normocephalic.   Eyes:  Eyes conjunctiva clear lids without ptosis, pupils equal and reactive to light accommodation.    ENT: Ears normal shape and contour, canals are clear bilaterally, tympanic membranes are benign.  Oropharynx is without erythema, edema or exudates.   Neck: Supple without JVD. No lymphadenopathy.  Pulmonary:  Normal effort.  Clear to ausculation without rales, ronchi, or wheezing.  Cardiovascular:  Regular rate and rhythm without murmur, rubs or gallop.  Radial pulses are intact and equal bilaterally.  Gastrointestinal: Abdomen soft, nontender, nondistended. Normal bowel sounds. Liver and spleen are not palpable.  Musculoskeletal:  No extremity cyanosis, clubbing, or edema.  Skin:  Warm and dry.  No obvious lesions.  Lymph: No cervical or supraclavicular lymph nodes are palpable.  Neurologic: Grossly intact.  DTRs 2+/3 bilaterally.  Sensation intact.   Psych: Normal mood and affect. Alert and oriented x3. Judgment and insight is normal.    Diagnostic Testing/Findings:  Labs:  POCT A1C 1/13/2021 10.4     Assessment/Plan   1. Type 2 diabetes mellitus without complication, without long-term current use of insulin (HCC)  This is a chronic condition, new to me.  He reports he was diagnosed with diabetes in his late 20s.  He was previously taking his blood sugars every day and taking metformin.  However, he felt that he was managing his blood sugars well with diet and exercise (he reports he was nearing 400 lbs and has since lost over 100 lbs).  He also reports that metformin caused him GI upset, so he did not continue to  take it either.  He does have a significant family history for diabetes, including his mother, father, and two brothers.  His A1C in clinic was 10.4.  We discussed that we will restart him on metformin 500 mg twice a day.  Because of his previous GI upset with the metformin, I have advised him to start taking his metformin tablet once a day for about a week and then begin taking his metformin twice a day.  We discussed to continue with his diet as he has a very good understanding on the appropriate foods for a diabetic diet.  We also discussed to continue with his exercise routine.  I did review with him that we will obtain a few labs to check on his kidney function as well as cholesterol.  He does not currently have concomitant hypertension (office /72), so treatment for high blood pressure is not warranted at this time.  I also informed him to please check his sugars at least once a day, log them, and bring them in with his next visit in 3 months.  - POCT Hemoglobin A1C  - Comp Metabolic Panel; Future  - Lipid Profile; Future  - TSH WITH REFLEX TO FT4; Future  - REFERRAL FOR RETINAL SCREENING EXAM  - MICROALBUMIN, URINE  - metFORMIN (GLUCOPHAGE) 500 MG Tab; Take 1 Tab by mouth 2 times a day, with meals.  Dispense: 180 Tab; Refill: 0  - Blood Glucose Test Strips; Test strips order: Test strips for Glucometer ordered meter. Sig: check sugars daily #100 RF x 3  Dispense: 1 Each; Refill: 0  - glucose monitoring kit (FREESTYLE) monitoring kit; Check sugars daily  Dispense: 1 Kit; Refill: 0  - Lancets; Lancets order: Lancets for Glucometer ordered meter. Sig: Check sugars once a day. #100 RF x 3  Dispense: 100 Each; Refill: 11  - VITAMIN D,25 HYDROXY; Future    2. Acute pain of right knee  This is an acute condition.  He reports that he had a previous injury to his right knee in high school, and it was not worked up further.  He has been able to continue to exercise, including running and weight training.  He does  note that over the last 3 months that his right knee has been swelling more.  He also reports that he has had some pain (pain a 4 out of 10) towards the end of the day.  At this point, we will get an x-ray.  We did discuss his physical therapy as an option to help with his pain to provide exercises to help strengthen his knee, and he is willing to attend.  He did reinforce to continue to use ice to the area to help decrease swelling, elevate his leg on pillows towards the end of the day, and use of ibuprofen as needed for his pain.  - DX-KNEE 2- RIGHT; Future  - REFERRAL TO PHYSICAL THERAPY    Health Maintenance: Completed    Return in about 3 months (around 4/13/2021) for follow up diabetes.    I have placed the above orders and discussed them with an approved delegating provider.  The MA is performing the below orders under the direction of Dr. Katie DO.     I spent a total of 40 minutes with record review, exam, and communication with the patient, communication with other providers, and documentation of this encounter.    Please note that this dictation was created using voice recognition software. I have made every reasonable attempt to correct obvious errors, but I expect that there are errors of grammar and possibly content that I did not discover before finalizing the note.    YURY Holbrook  [facility]

## 2021-01-13 NOTE — PATIENT INSTRUCTIONS
Please check blood sugar once a day - preferably before you eat.    Please bring your sugar log with you to your next visit.

## 2021-01-21 ENCOUNTER — HOSPITAL ENCOUNTER (OUTPATIENT)
Dept: RADIOLOGY | Facility: MEDICAL CENTER | Age: 41
End: 2021-01-21
Attending: NURSE PRACTITIONER
Payer: COMMERCIAL

## 2021-01-21 DIAGNOSIS — M25.561 ACUTE PAIN OF RIGHT KNEE: ICD-10-CM

## 2021-01-21 PROCEDURE — 73560 X-RAY EXAM OF KNEE 1 OR 2: CPT | Mod: RT

## 2021-01-22 ENCOUNTER — HOSPITAL ENCOUNTER (OUTPATIENT)
Dept: LAB | Facility: MEDICAL CENTER | Age: 41
End: 2021-01-22
Attending: NURSE PRACTITIONER
Payer: COMMERCIAL

## 2021-01-22 DIAGNOSIS — E11.9 TYPE 2 DIABETES MELLITUS WITHOUT COMPLICATION, WITHOUT LONG-TERM CURRENT USE OF INSULIN (HCC): ICD-10-CM

## 2021-01-22 LAB
25(OH)D3 SERPL-MCNC: 24 NG/ML (ref 30–100)
ALBUMIN SERPL BCP-MCNC: 4.3 G/DL (ref 3.2–4.9)
ALBUMIN/GLOB SERPL: 1.4 G/DL
ALP SERPL-CCNC: 82 U/L (ref 30–99)
ALT SERPL-CCNC: 18 U/L (ref 2–50)
ANION GAP SERPL CALC-SCNC: 7 MMOL/L (ref 7–16)
AST SERPL-CCNC: 14 U/L (ref 12–45)
BILIRUB SERPL-MCNC: 0.8 MG/DL (ref 0.1–1.5)
BUN SERPL-MCNC: 15 MG/DL (ref 8–22)
CALCIUM SERPL-MCNC: 9.5 MG/DL (ref 8.5–10.5)
CHLORIDE SERPL-SCNC: 97 MMOL/L (ref 96–112)
CHOLEST SERPL-MCNC: 139 MG/DL (ref 100–199)
CO2 SERPL-SCNC: 27 MMOL/L (ref 20–33)
CREAT SERPL-MCNC: 0.64 MG/DL (ref 0.5–1.4)
CREAT UR-MCNC: 170.53 MG/DL
GLOBULIN SER CALC-MCNC: 3 G/DL (ref 1.9–3.5)
GLUCOSE SERPL-MCNC: 194 MG/DL (ref 65–99)
HDLC SERPL-MCNC: 55 MG/DL
LDLC SERPL CALC-MCNC: 66 MG/DL
MICROALBUMIN UR-MCNC: <1.2 MG/DL
MICROALBUMIN/CREAT UR: NORMAL MG/G (ref 0–30)
POTASSIUM SERPL-SCNC: 4.4 MMOL/L (ref 3.6–5.5)
PROT SERPL-MCNC: 7.3 G/DL (ref 6–8.2)
SODIUM SERPL-SCNC: 131 MMOL/L (ref 135–145)
T4 FREE SERPL-MCNC: 1.25 NG/DL (ref 0.93–1.7)
TRIGL SERPL-MCNC: 88 MG/DL (ref 0–149)
TSH SERPL DL<=0.005 MIU/L-ACNC: 0.32 UIU/ML (ref 0.38–5.33)

## 2021-01-22 PROCEDURE — 80053 COMPREHEN METABOLIC PANEL: CPT

## 2021-01-22 PROCEDURE — 82570 ASSAY OF URINE CREATININE: CPT

## 2021-01-22 PROCEDURE — 36415 COLL VENOUS BLD VENIPUNCTURE: CPT

## 2021-01-22 PROCEDURE — 80061 LIPID PANEL: CPT

## 2021-01-22 PROCEDURE — 84443 ASSAY THYROID STIM HORMONE: CPT

## 2021-01-22 PROCEDURE — 82043 UR ALBUMIN QUANTITATIVE: CPT

## 2021-01-22 PROCEDURE — 84439 ASSAY OF FREE THYROXINE: CPT

## 2021-01-22 PROCEDURE — 82306 VITAMIN D 25 HYDROXY: CPT

## 2021-01-25 DIAGNOSIS — R79.89 LOW TSH LEVEL: ICD-10-CM

## 2021-01-25 RX ORDER — BLOOD-GLUCOSE METER
KIT MISCELLANEOUS
COMMUNITY
Start: 2021-01-13

## 2021-01-25 NOTE — PROGRESS NOTES
1. Low TSH level  TSH 0.322 with normal T4.  Will recheck in 6 weeks.  Pt notified via [facility].  - TSH WITH REFLEX TO FT4; Future

## 2021-02-04 ENCOUNTER — APPOINTMENT (OUTPATIENT)
Dept: PHYSICAL THERAPY | Facility: REHABILITATION | Age: 41
End: 2021-02-04
Attending: NURSE PRACTITIONER
Payer: COMMERCIAL

## 2021-02-24 ENCOUNTER — PHYSICAL THERAPY (OUTPATIENT)
Dept: PHYSICAL THERAPY | Facility: REHABILITATION | Age: 41
End: 2021-02-24
Attending: NURSE PRACTITIONER
Payer: COMMERCIAL

## 2021-02-24 DIAGNOSIS — M25.561 ACUTE PAIN OF RIGHT KNEE: ICD-10-CM

## 2021-02-24 PROCEDURE — 97161 PT EVAL LOW COMPLEX 20 MIN: CPT

## 2021-02-24 SDOH — ECONOMIC STABILITY: GENERAL: QUALITY OF LIFE: EXCELLENT

## 2021-02-24 ASSESSMENT — ENCOUNTER SYMPTOMS
PAIN TIMING: INTERMITTENT
CLUSTER HEADACHES: 1
PAIN SCALE AT LOWEST: 0
QUALITY: ACHING
PAIN SCALE: 1
QUALITY: DISCOMFORT
PAIN SCALE AT HIGHEST: 2

## 2021-02-24 ASSESSMENT — ACTIVITIES OF DAILY LIVING (ADL): POOR_BALANCE: 1

## 2021-02-24 NOTE — OP THERAPY EVALUATION
"  Outpatient Physical Therapy  INITIAL EVALUATION    [facility] [place] [address] [address]  [address]  [place] [address]  Phone:  [phone]  Fax:  [phone]    Date of Evaluation: 2021    Patient: Soham Newman  YOB: 1980  MRN: [id number]     Referring Provider: No referring provider defined for this encounter.   Referring Diagnosis Acute pain of right knee [M25.561]     Time Calculation    Start time: 730  Stop time: 820 Time Calculation (min): 50 minutes         Chief Complaint: Knee Problem    Visit Diagnoses     ICD-10-CM   1. Acute pain of right knee  M25.561       No data found  Subjective:   History of Present Illness:     Mechanism of injury:  Patient with h/o DMII reports several year history history of R>L knee pain. Sx have flared again recently when he took up running on the [employer] in  December.     R knee swells primarily when he runs, but sometimes it hurts when he's walking. Knee feels like it gets \"stuck\" with a kink in it. It also feels unstable at times recently. Sx relieved by wearing a patellar  brace, by ice/game ready. L also bothers him, but it doesn't swell up, more often just \"pops\".    Denies n/t. Denies radiating pain. Denies buckling but both knees catch and click. Prior to running, was working out frequently doing both arm and leg exercises, squats, etc, with no knee pain.   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   Headaches:  cluster headaches  Sleep disturbance:  Not disrupted  Pain:     Current pain ratin    At best pain ratin    At worst pain ratin    Quality:  Discomfort and aching    Pain timing:  Intermittent    Progression:  Improving  Social Support:     Patient lives at: single parent to young children. has a home gym.    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 Irizarry- type 2   • Diabetes (HCC)    • Heart burn    • Seizure (HCC)     as an infant     Past Surgical History:   Procedure Laterality Date   • CIRCUMCISION ADULT  1/12/2010    Performed by RYAN KRUEGER at SURGERY [facility]   • OTHER  2010    adult circumcision     Social History     Tobacco Use   • Smoking status: Former Smoker     Packs/day: 0.10     Years: 8.00     Pack years: 0.80     Types: Cigarettes   • Smokeless tobacco: Never Used   • Tobacco comment: 2 daily   Substance Use Topics   • Alcohol use: Not Currently     Family and Occupational History     Socioeconomic History   • Marital status: Single     Spouse name: Not on file   • Number of children: Not on file   • Years of education: Not on file   • Highest education level: Not on file   Occupational History   • Occupation: [employer]     Employer: [employer]       Objective     Observations     Additional Observation Details  Bilateral genuvalgus and mild genurecurvatum    Neurological Testing     Sensation     Knee   Left Knee   Intact: light touch    Right Knee   Intact: light touch     Reflexes   Left   Patellar (L4): trace (1+)  Achilles (S1): trace (1+)    Right   Patellar (L4): trace (1+)  Achilles (S1): trace (1+)    Palpation     Additional Palpation Details  No TTP    Active Range of Motion   Left Hip   Normal active range of motion    Right Hip   Normal active range of motion    Right Knee    Normal active range of motion    Passive Range of Motion   Left Knee   Normal passive range of motion    Right Knee   Normal passive range of motion    Additional Passive Range of Motion Details  Symmetric. No pain with OP.     Patellar Static Positioning   Left Knee: WFL  Right Knee: WFL    Patellar Mobility   Left Knee Patellar tendons within functional limits include the medial, lateral, superior and inferior.     Right Knee Patellar tendons within functional limits include the medial, lateral, superior and inferior.     Strength:      Left Hip   Planes of Motion   Flexion: 5  Extension: 3+  Abduction: 4  External rotation: 4+  Internal rotation: 5    Right Hip   Planes of Motion   Flexion: 5  Extension: 3+  Abduction: 4  External rotation: 4+  Internal rotation: 5    Left Knee   Normal strength  Quadriceps contraction: fair    Right Knee   Normal strength  Quadriceps contraction: fair    Left Ankle/Foot   Plantar flexion: 4    Right Ankle/Foot   Plantar flexion: 2+    Tests     Additional Tests Details  Ligamentous testing WFL bilaterally without provocation of sx varus valgus at 0 deg and 30 deg, anterior/posterior drawer, Lachmann's  Negative patellar compression bilaterally  Ambulation     Observational Gait   Walking speed and stride length within functional limits.     Quality of Movement During Gait     Knee    Knee (Left): Positive increased ER tibial torsion and valgus.   Knee (Right): Positive increased ER tibial torsion and valgus.     Ankle    Ankle (Left): Positive medial heel whip and pronated.   Ankle (Right): Positive supinated.     Functional Assessment   Squat   Left tibial anterior translation beyond toes and right tibial anterior translation beyond toes.     Single Leg Squat   Left Leg  Left trunk side bending, valgus and anterior tibial translation beyond toes.     Right Leg  Right trunk side bending, valgus and tibial anterior translation beyond toes.     Single Leg Stance   Left: 20  "(minimal glute engagement, foot pronating, excessive ankle strategy trunk tilt L) seconds  Right: 20 (minimal glute engagement, foot pronating, excessive ankle strategy trunk tilt R) seconds        Therapeutic Exercises (CPT 75090):       Therapeutic Exercise Summary: Access Code: [email or web address]  URL: [email or web address]  Date: 02/24/2021  Prepared by: Jen Whiting    Exercises  Single Leg Stance - 3 reps - 30 seconds hold - 3x daily - 7x weekly  Single Leg Heel Raise - 10 reps - 2 sets - 1x daily - 7x weekly  Standing Terminal Knee Extension with Resistance - 10 reps - 2 sets - 5 second hold - 1x daily - 7x weekly      Time-based treatments/modalities:           Assessment, Response and Plan:   Impairments: abnormal or restricted ROM, impaired balance, impaired physical strength, lacks appropriate home exercise program and pain with function    Assessment details:  41 year old male presents with chief complaint of R>L knee pain and \"clicking'catching\" exacerbated primarily by running, which he recently added to his exercise program to help him manage his DMII. Exam findings show impaired dynamic stability with single leg challenges, with patient demonstrating pronation, genuvalgus, and trunk trunk shift over standing leg with static single leg WB. Other impairments/limitations as listed above. No sx with ligamentous or joint provocation testing at this session. The patient will benefit from skilled PT to address the above in order to improve function and QOL.   Barriers to therapy:  None  Prognosis: good    Goals:   Short Term Goals:   Patient is independent/compliant with HEP  Short term goal time span:  2-4 weeks      Long Term Goals:    Single LE hop x 30 each leg with improved form without cueing from therapist  WOMAC score = 0  Patient restarts couch to 5k program without provocation of symptoms  Patient reports no clicking/catching at knees at work 95% of the time  Long term goal time span:  6-8 " weeks    Plan:   Therapy options:  Physical therapy treatment to continue  Planned therapy interventions:  E Stim Unattended (CPT 27556), Neuromuscular Re-education (CPT 79826) and Therapeutic Exercise (CPT 53738)  Frequency:  2x week  Duration in visits:  12  Discussed with:  Patient      Functional Assessment Used  WOMAC Grand Total: 27.08     Referring provider co-signature:  I have reviewed this plan of care and my co-signature certifies the need for services.    Certification Period: 02/24/2021 to  04/28/21    Physician Signature: ________________________________ Date: ______________

## 2021-03-01 ENCOUNTER — OFFICE VISIT (OUTPATIENT)
Dept: MEDICAL GROUP | Facility: PHYSICIAN GROUP | Age: 41
End: 2021-03-01
Payer: COMMERCIAL

## 2021-03-01 VITALS
HEIGHT: 72 IN | HEART RATE: 66 BPM | TEMPERATURE: 98.5 F | SYSTOLIC BLOOD PRESSURE: 118 MMHG | BODY MASS INDEX: 34.81 KG/M2 | DIASTOLIC BLOOD PRESSURE: 66 MMHG | WEIGHT: 257 LBS | OXYGEN SATURATION: 96 %

## 2021-03-01 DIAGNOSIS — E11.9 TYPE 2 DIABETES MELLITUS WITHOUT COMPLICATION, WITHOUT LONG-TERM CURRENT USE OF INSULIN (HCC): ICD-10-CM

## 2021-03-01 DIAGNOSIS — R06.89 BREATH HOLDING EPISODES: ICD-10-CM

## 2021-03-01 PROCEDURE — 99213 OFFICE O/P EST LOW 20 MIN: CPT | Performed by: NURSE PRACTITIONER

## 2021-03-01 RX ORDER — BLOOD-GLUCOSE METER
KIT MISCELLANEOUS
COMMUNITY
Start: 2021-01-17

## 2021-03-01 NOTE — ASSESSMENT & PLAN NOTE
This is a chronic condition.  He reports that he is taking the metformin regularly.  He reports that he has been maintaining his diet.  He is eating non-carb, non-fat diet and eating plenty of vegetables and protein.  He has started taking a fiber supplement due to constipation.  He reports that his sugars are between 150-180.

## 2021-03-01 NOTE — ASSESSMENT & PLAN NOTE
This is a chronic condition.  He reports that his girlfriend has noticed that he is holding his breath at night.  He reports that she notices that he sounds as though he is unable to catch his breath.  He also reports that she has noticed that it occurs most often when he is sleeping on his back, but not as often when he is laying on his side.  He reports that he feels very tired when he wakes up, about 3 days of the week.  He reports that he goes to bed around 9-10p and wakes up around 5p.  He reports that he has a very good sleep hygiene.  He reports that he feels tired by the end of the day.

## 2021-03-01 NOTE — PROGRESS NOTES
Subjective  Chief Complaint  Breathing concerns at night    History of Present Illness  Soham Newman is a 41 y.o. male. This established patient is here today discuss the following:    Breath holding episodes  This is a chronic condition.  He reports that his girlfriend has noticed that he is holding his breath at night.  He reports that she notices that he sounds as though he is unable to catch his breath.  He also reports that she has noticed that it occurs most often when he is sleeping on his back, but not as often when he is laying on his side.  He reports that he feels very tired when he wakes up, about 3 days of the week.  He reports that he goes to bed around 9-10p and wakes up around 5p.  He reports that he has a very good sleep hygiene.  He reports that he feels tired by the end of the day.    Type 2 diabetes mellitus without complication, without long-term current use of insulin (HCC)  This is a chronic condition.  He reports that he is taking the metformin regularly.  He reports that he has been maintaining his diet.  He is eating non-carb, non-fat diet and eating plenty of vegetables and protein.  He has started taking a fiber supplement due to constipation.  He reports that his sugars are between 150-180.    Past Medical History    Allergies: Patient has no known allergies.  Past Medical History:   Diagnosis Date   • Cold    • Diabetes 12/09   • Diabetes 2010    Dr. Irizarry- type 2   • Diabetes ([facility])    • Heart burn    • Seizure (HCC)     as an infant     Current Outpatient Medications Ordered in Epic   Medication Sig Dispense Refill   • FREESTYLE LITE strip USE TO TEST SUGARS ONCE DAILY     • Blood Glucose Monitoring Suppl (FREESTYLE LITE) Device USE AS DIRECTED TO CHECK BLOOD SUGAR DAILY     • metFORMIN (GLUCOPHAGE) 500 MG Tab Take 1 Tab by mouth 2 times a day, with meals. 180 Tab 0   • Blood Glucose Test Strips Test strips order: Test strips for Glucometer ordered meter. Sig: check sugars  daily #100 RF x 3 1 Each 0   • glucose monitoring kit (FREESTYLE) monitoring kit Check sugars daily 1 Kit 0   • Lancets Lancets order: Lancets for Glucometer ordered meter. Sig: Check sugars once a day. #100 RF x 3 100 Each 11     No current [facility]-ordered facility-administered medications on file.     Review of Systems:   General: Negative for fever/chills.   Respiratory:  Negative for cough and dyspnea.    Cardiovascular:  Negative for chest pain and palpitations.  Gastrointestinal:  Positive for constipation that is relieved with fiber.   Skin:  Negative for rash.   Neurological:  Negative for numbness/tingling.     Objective  Physical Exam:   /66 (BP Location: Left arm, Patient Position: Sitting, BP Cuff Size: Large adult)   Pulse 66   Temp 36.9 °C (98.5 °F) (Temporal)   Ht 1.829 m (6')   Wt 117 kg (257 lb)   SpO2 96%  Body mass index is 34.86 kg/m².  General:  Alert and oriented.  Well appearing.  NAD  Neck: Supple without JVD. No lymphadenopathy.  Pulmonary:  Normal effort.  Clear to ausculation without rales, ronchi, or wheezing.  Cardiovascular:  Regular rate and rhythm without murmur, rubs or gallop.   Skin:  Warm and dry.  No obvious lesions.  Musculoskeletal:  No extremity cyanosis, clubbing, or edema.    Assessment/Plan  1. Breath holding episodes  This is a new condition.    Appears to be sleep apnea.    Referral to sleep medicine for further evaluation.  - REFERRAL TO PULMONARY AND SLEEP MEDICINE    2. Type 2 diabetes mellitus without complication, without long-term current use of insulin (HCC)  This is a chronic condition, stable.   Continue with current treatment plan.   Will complete A1C and monofilament exam at previously scheduled visit for diabetes follow up.     Health Maintenance: Completed    Return if symptoms worsen or fail to improve.    Patient verbalized understanding and agreed to plan of care.  We have discussed to contact me with needs via [employer] or by phone if needed.      I  have placed the above orders and discussed them with an approved delegating provider.  The MA is performing the above orders under the direction of Dr. Katie DO.    My total time spent caring for the patient on the day of the encounter was 22 minutes.  This does not include time spent on separately billable procedures/tests.    Please note that this dictation was created using voice recognition software. I have made every reasonable attempt to correct obvious errors, but I expect that there are errors of grammar and possibly content that I did not discover before finalizing the note.    YURY Holbrook  [facility]

## 2021-03-15 ENCOUNTER — APPOINTMENT (OUTPATIENT)
Dept: PHYSICAL THERAPY | Facility: REHABILITATION | Age: 41
End: 2021-03-15
Attending: NURSE PRACTITIONER
Payer: COMMERCIAL

## 2021-03-17 ENCOUNTER — APPOINTMENT (OUTPATIENT)
Dept: PHYSICAL THERAPY | Facility: REHABILITATION | Age: 41
End: 2021-03-17
Attending: NURSE PRACTITIONER
Payer: COMMERCIAL

## 2021-03-17 DIAGNOSIS — E11.9 TYPE 2 DIABETES MELLITUS WITHOUT COMPLICATION, WITHOUT LONG-TERM CURRENT USE OF INSULIN (HCC): ICD-10-CM

## 2021-04-10 DIAGNOSIS — E11.9 TYPE 2 DIABETES MELLITUS WITHOUT COMPLICATION, WITHOUT LONG-TERM CURRENT USE OF INSULIN (HCC): ICD-10-CM

## 2021-08-03 ENCOUNTER — OFFICE VISIT (OUTPATIENT)
Dept: MEDICAL GROUP | Facility: PHYSICIAN GROUP | Age: 41
End: 2021-08-03
Payer: COMMERCIAL

## 2021-08-03 VITALS
DIASTOLIC BLOOD PRESSURE: 70 MMHG | HEIGHT: 72 IN | WEIGHT: 250.5 LBS | RESPIRATION RATE: 12 BRPM | BODY MASS INDEX: 33.93 KG/M2 | OXYGEN SATURATION: 97 % | TEMPERATURE: 98.8 F | HEART RATE: 70 BPM | SYSTOLIC BLOOD PRESSURE: 128 MMHG

## 2021-08-03 DIAGNOSIS — Z23 NEED FOR VACCINATION: ICD-10-CM

## 2021-08-03 DIAGNOSIS — E11.9 TYPE 2 DIABETES MELLITUS WITHOUT COMPLICATION, WITHOUT LONG-TERM CURRENT USE OF INSULIN (HCC): ICD-10-CM

## 2021-08-03 LAB
HBA1C MFR BLD: 10.2 % (ref 0–5.6)
INT CON NEG: ABNORMAL
INT CON POS: ABNORMAL

## 2021-08-03 PROCEDURE — 90472 IMMUNIZATION ADMIN EACH ADD: CPT | Performed by: NURSE PRACTITIONER

## 2021-08-03 PROCEDURE — 99214 OFFICE O/P EST MOD 30 MIN: CPT | Mod: 25 | Performed by: NURSE PRACTITIONER

## 2021-08-03 PROCEDURE — 90746 HEPB VACCINE 3 DOSE ADULT IM: CPT | Performed by: NURSE PRACTITIONER

## 2021-08-03 PROCEDURE — 90471 IMMUNIZATION ADMIN: CPT | Performed by: NURSE PRACTITIONER

## 2021-08-03 PROCEDURE — 83036 HEMOGLOBIN GLYCOSYLATED A1C: CPT | Performed by: NURSE PRACTITIONER

## 2021-08-03 PROCEDURE — 90715 TDAP VACCINE 7 YRS/> IM: CPT | Performed by: NURSE PRACTITIONER

## 2021-08-03 NOTE — ASSESSMENT & PLAN NOTE
This is a chronic condition.  Current medications:  Metformin 500 mg BID  Last A1c:  Due today  Last Microalb/Cr ratio:  WNL - 1/22/2021  Fasting sugars:  194 - 1/22/2021  Last diabetic foot exam:  Complete today  Last retinal eye exam:  Done - 1/28/2021  ACEi/ARB?  N/A at this time  Statin?  N/A at this time  Aspirin?  N/A at this time; < 50 without cardiovascular risk  Concomitant HTN?  None - BP WNL   Nightly foot checks?  Yes  He does check sugars at home - low 200s.  He denies polyuria, polydipsia, weight loss, numbness or tingling, and blurred vision.    He reports over the summer it is more difficult for him to watch his food intake as well as engage in physical activity.  He reports that he will resume strict diet and exercise routine.  He does endorse continuing to take metformin twice a day without side effects or concerns.

## 2021-08-03 NOTE — PROGRESS NOTES
Subjective  Chief Complaint  Chief Complaint   Patient presents with   • Follow-Up     History of Present Illness  Soham Newman is a 41 y.o. male. This established patient is here today discuss the following:    Type 2 diabetes mellitus without complication, without long-term current use of insulin (HCC)  This is a chronic condition.  Current medications:  Metformin 500 mg BID  Last A1c:  Due today  Last Microalb/Cr ratio:  WNL - 1/22/2021  Fasting sugars:  194 - 1/22/2021  Last diabetic foot exam:  Complete today  Last retinal eye exam:  Done - 1/28/2021  ACEi/ARB?  N/A at this time  Statin?  N/A at this time  Aspirin?  N/A at this time; < 50 without cardiovascular risk  Concomitant HTN?  None - BP WNL   Nightly foot checks?  Yes  He does check sugars at home - low 200s.  He denies polyuria, polydipsia, weight loss, numbness or tingling, and blurred vision.    He reports over the summer it is more difficult for him to watch his food intake as well as engage in physical activity.  He reports that he will resume strict diet and exercise routine.  He does endorse continuing to take metformin twice a day without side effects or concerns.     Past Medical History    Allergies: Patient has no known allergies.  Past Medical History:   Diagnosis Date   • Cold    • Diabetes 12/09   • Diabetes 2010    Dr. Irizarry- type 2   • Diabetes (HCC)    • Heart burn    • Seizure (HCC)     as an infant     Current Outpatient Medications Ordered in Epic   Medication Sig Dispense Refill   • metFORMIN (GLUCOPHAGE) 1000 MG tablet Take 1 tablet by mouth 2 times a day with meals. 180 tablet 1   • Dulaglutide 0.75 MG/0.5ML Solution Pen-injector Inject 0.5 mL under the skin every 7 days. 6 mL 0   • Blood Glucose Test Strips Test strips order: Test strips for Abbott Freestyle Lite meter. Sig: use daily and as needed for signs/symptoms of high or low blood sugar #100 RF x 3 100 Each 3   • FREESTYLE LITE strip USE TO TEST SUGARS ONCE DAILY      • Blood Glucose Monitoring Suppl (FREESTYLE LITE) Device USE AS DIRECTED TO CHECK BLOOD SUGAR DAILY     • glucose monitoring kit (FREESTYLE) monitoring kit Check sugars daily 1 Kit 0   • Lancets Lancets order: Lancets for Glucometer ordered meter. Sig: Check sugars once a day. #100 RF x 3 100 Each 11     No current Epic-ordered facility-administered medications on file.     Review of Systems:   See HPI.     Objective  Physical Exam:   /70 (BP Location: Right arm, Patient Position: Sitting, BP Cuff Size: Large adult)   Pulse 70   Temp 37.1 °C (98.8 °F) (Temporal)   Resp 12   Ht 1.829 m (6')   Wt 114 kg (250 lb 8 oz)   SpO2 97%  Body mass index is 33.97 kg/m².  General: Alert, no distress, well-groomed.  Skin: Warm, dry, good turgor, no rashes in visible areas.  Eye: Equal, round and reactive, conjunctiva clear, lids normal.  ENMT: Lips without lesions, good dentition, moist mucous membranes.  Neck: Trachea midline, no masses, no thyromegaly.  Respiratory: Unlabored respiratory effort, no cough.  Musculoskeletal: Normal gait, moves all extremities.  Diabetic foot exam: No lesions or calluses noted. 2+ pedal pulses. Sensation intact with 10 out of 10 on monofilament test.  Neuro: Grossly non-focal.   Psych: Alert and oriented x3, normal affect and mood.    Assessment/Plan  1. Type 2 diabetes mellitus without complication, without long-term current use of insulin (HCC)  This is a chronic condition, not controlled.   A1c with little improvement over the last 7 months.   Will add GLP-1 to medication regimen.  Increase metformin dose to 1000 mg BID with meals.  Strongly advised and reinforced importance of diet and exercise.  Recheck A1c in 3 months.    - POCT  A1C :  10.2  - Diabetic Monofilament LE Exam  - metFORMIN (GLUCOPHAGE) 1000 MG tablet; Take 1 tablet by mouth 2 times a day with meals.  Dispense: 180 tablet; Refill: 1  - Dulaglutide 0.75 MG/0.5ML Solution Pen-injector; Inject 0.5 mL under the skin  every 7 days.  Dispense: 6 mL; Refill: 0    2. Need for vaccination  - Hepatitis B Vaccine Adult 20+  - Tdap =>6yo IM    3. BMI 33.0-33.9,adult  See #1.   - Dulaglutide 0.75 MG/0.5ML Solution Pen-injector; Inject 0.5 mL under the skin every 7 days.  Dispense: 6 mL; Refill: 0    Health Maintenance: Completed    Return in about 3 months (around 11/3/2021), or if symptoms worsen or fail to improve, for diabetes.    Patient verbalized understanding and agreed to plan of care.  We have discussed to contact me with needs via [employer] or by phone if needed.      I have placed the above orders and discussed them with an approved delegating provider.  The MA is performing the above orders under the direction of Dr. Wilson.    Please note that this dictation was created using voice recognition software. I have made every reasonable attempt to correct obvious errors, but I expect that there are errors of grammar and possibly content that I did not discover before finalizing the note.    YURY Holbrook  [facility]

## 2021-09-22 ENCOUNTER — PATIENT MESSAGE (OUTPATIENT)
Dept: MEDICAL GROUP | Facility: PHYSICIAN GROUP | Age: 41
End: 2021-09-22

## 2021-09-22 DIAGNOSIS — E11.9 TYPE 2 DIABETES MELLITUS WITHOUT COMPLICATION, WITHOUT LONG-TERM CURRENT USE OF INSULIN (HCC): ICD-10-CM

## 2021-09-22 NOTE — TELEPHONE ENCOUNTER
From: Soham Newman  To: Nurse Practitioner Jessica Shaw  Sent: 9/22/2021 10:49 AM PDT  Subject: Prescription fill     HI Jessica i am out of the trulicity injections i took my last shot this monday can i get the prescription filled again

## 2021-09-24 ENCOUNTER — PATIENT MESSAGE (OUTPATIENT)
Dept: MEDICAL GROUP | Facility: PHYSICIAN GROUP | Age: 41
End: 2021-09-24

## 2021-09-24 ENCOUNTER — TELEPHONE (OUTPATIENT)
Dept: MEDICAL GROUP | Facility: PHYSICIAN GROUP | Age: 41
End: 2021-09-24

## 2021-09-24 DIAGNOSIS — E11.9 TYPE 2 DIABETES MELLITUS WITHOUT COMPLICATION, WITHOUT LONG-TERM CURRENT USE OF INSULIN (HCC): ICD-10-CM

## 2021-09-24 NOTE — TELEPHONE ENCOUNTER
Phone Number Called: [phone]    Call outcome: Spoke to pharmacy staff    Message: This RN called patient pharmacy to check on what the delay is in getting the patient his Trulicity refill. It appears that the order was placed for a 3 month supply, but patient is requesting a refill after 1 month stating he is out. This RN asked pharmacy staff if patient was provided with a 1 month supply instead of 3 month supply in August.  Spoke to Linnette with [facility] pharmacy.  Patient did get 3 month supply per Linnette and can refill it on 9/26.     Call placed to patient. Patient states he only received 2 boxes and is using one pen every Monday morning. Will need new script for refill on 9/26. Will have Elisabet COTTON covering for Jessica COTTON send new script.

## 2021-11-03 ENCOUNTER — OFFICE VISIT (OUTPATIENT)
Dept: MEDICAL GROUP | Facility: PHYSICIAN GROUP | Age: 41
End: 2021-11-03
Payer: COMMERCIAL

## 2021-11-03 VITALS
RESPIRATION RATE: 12 BRPM | WEIGHT: 236 LBS | SYSTOLIC BLOOD PRESSURE: 124 MMHG | TEMPERATURE: 98.4 F | OXYGEN SATURATION: 96 % | DIASTOLIC BLOOD PRESSURE: 70 MMHG | HEART RATE: 77 BPM | HEIGHT: 72 IN | BODY MASS INDEX: 31.97 KG/M2

## 2021-11-03 DIAGNOSIS — N52.9 INABILITY TO MAINTAIN ERECTION: ICD-10-CM

## 2021-11-03 DIAGNOSIS — Z23 NEED FOR VACCINATION: ICD-10-CM

## 2021-11-03 DIAGNOSIS — Z11.3 SCREEN FOR SEXUALLY TRANSMITTED DISEASES: ICD-10-CM

## 2021-11-03 DIAGNOSIS — E11.9 TYPE 2 DIABETES MELLITUS WITHOUT COMPLICATION, WITHOUT LONG-TERM CURRENT USE OF INSULIN (HCC): ICD-10-CM

## 2021-11-03 LAB
HBA1C MFR BLD: 7.2 % (ref 0–5.6)
INT CON NEG: ABNORMAL
INT CON POS: ABNORMAL

## 2021-11-03 PROCEDURE — 90471 IMMUNIZATION ADMIN: CPT | Performed by: NURSE PRACTITIONER

## 2021-11-03 PROCEDURE — 90686 IIV4 VACC NO PRSV 0.5 ML IM: CPT | Performed by: NURSE PRACTITIONER

## 2021-11-03 PROCEDURE — 99214 OFFICE O/P EST MOD 30 MIN: CPT | Mod: 25 | Performed by: NURSE PRACTITIONER

## 2021-11-03 PROCEDURE — 83036 HEMOGLOBIN GLYCOSYLATED A1C: CPT | Performed by: NURSE PRACTITIONER

## 2021-11-03 RX ORDER — SILDENAFIL 50 MG/1
50 TABLET, FILM COATED ORAL PRN
Qty: 10 TABLET | Refills: 1 | Status: SHIPPED | OUTPATIENT
Start: 2021-11-03 | End: 2023-09-26

## 2021-11-03 NOTE — ASSESSMENT & PLAN NOTE
This is a chronic condition, not controlled - improving.  A1c improved from 10.2% to 7.2% today.  He does endorse he misses second dose of metformin about 3-4x a week. He does report compliance with weekly injection of Trulicity.  He is working more, which he is very physically demanding.  He reports home sugars running in the 120s.  He denies polyuria, polydipsia, weight loss, numbness or tingling, and blurred vision.    > Decrease metformin to 1000 mg daily with AM meal.  Continue dulaglutide 0.75 mg weekly.  Continue close monitoring of fasting glucose sugars.  Continue diet and lifestyle measures.

## 2021-11-03 NOTE — ASSESSMENT & PLAN NOTE
This is a new condition.  He first noticed inability to maintain erection about 3-4 months ago.  He reports that is able to attain; however, he is unable to maintain and ejaculate even with self pleasure.    > Will trial sildenafil.  He would like STD testing - ordered today.  Side effects discussed; advised on ER precautions.

## 2021-11-03 NOTE — PROGRESS NOTES
Subjective  Chief Complaint  Chief Complaint   Patient presents with   • Follow-Up     History of Present Illness  Soham presents today with the following.  Problem   Inability to Maintain Erection   Type 2 Diabetes Mellitus Without Complication, Without Long-Term Current Use of Insulin (Hcc)     Past Medical History    Allergies: Patient has no known allergies.  Past Medical History:   Diagnosis Date   • Cold    • Diabetes 12/09   • Diabetes 2010    Dr. Irizarry- type 2   • Diabetes (HCC)    • Heart burn    • Seizure (HCC)     as an infant     Current Outpatient Medications Ordered in Epic   Medication Sig Dispense Refill   • Dulaglutide 0.75 MG/0.5ML Solution Pen-injector Inject 0.5 mL under the skin every 7 days. 2 mL 5   • metformin (GLUCOPHAGE) 1000 MG tablet Take 1,000 mg by mouth every day.     • sildenafil citrate (VIAGRA) 50 MG tablet Take 1 Tablet by mouth as needed for Erectile Dysfunction. 10 Tablet 1   • Blood Glucose Test Strips Test strips order: Test strips for Abbott Freestyle Lite meter. Sig: use daily and as needed for signs/symptoms of high or low blood sugar #100 RF x 3 100 Each 3   • FREESTYLE LITE strip USE TO TEST SUGARS ONCE DAILY     • Blood Glucose Monitoring Suppl (FREESTYLE LITE) Device USE AS DIRECTED TO CHECK BLOOD SUGAR DAILY     • glucose monitoring kit (FREESTYLE) monitoring kit Check sugars daily 1 Kit 0   • Lancets Lancets order: Lancets for Glucometer ordered meter. Sig: Check sugars once a day. #100 RF x 3 100 Each 11     No current [facility]-ordered facility-administered medications on file.     Review of Systems  See below.     Objective  Physical Exam  /70 (BP Location: Left arm, Patient Position: Sitting, BP Cuff Size: Adult)   Pulse 77   Temp 36.9 °C (98.4 °F) (Temporal)   Resp 12   Ht 1.829 m (6')   Wt 107 kg (236 lb)   SpO2 96%  Body mass index is 32.01 kg/m².  General:  Alert and oriented.  Well appearing.  NAD  Neck: Supple without JVD. No  lymphadenopathy.  Pulmonary:  Normal effort.  Clear to ausculation without rales, ronchi, or wheezing.  Cardiovascular:  Regular rate and rhythm without murmur, rubs or gallop.   Skin:  Warm and dry.  No obvious lesions.  Musculoskeletal:  No extremity cyanosis, clubbing, or edema.    Assessment/Plan  41 y.o. male with the following issues.    1. Type 2 diabetes mellitus without complication, without long-term current use of insulin (HCC)  POCT  A1C    Dulaglutide 0.75 MG/0.5ML Solution Pen-injector    HEMOGLOBIN A1C    CBC WITH DIFFERENTIAL    Comp Metabolic Panel    Lipid Profile    MICROALBUMIN, URINE   2. BMI 32.0-32.9,adult  Dulaglutide 0.75 MG/0.5ML Solution Pen-injector   3. Inability to maintain erection  sildenafil citrate (VIAGRA) 50 MG tablet   4. Screen for sexually transmitted diseases  HIV AG/AB COMBO ASSAY SCREENING    HEP C VIRUS ANTIBODY    T.PALLIDUM AB EIA    CHLAMYDIA/GC PCR URINE OR SWAB   5. Need for vaccination  INFLUENZA VACCINE QUAD INJ (PF)      Type 2 diabetes mellitus without complication, without long-term current use of insulin (HCC)  This is a chronic condition, not controlled - improving.  A1c improved from 10.2% to 7.2% today.  He does endorse he misses second dose of metformin about 3-4x a week. He does report compliance with weekly injection of Trulicity.  He is working more, which he is very physically demanding.  He reports home sugars running in the 120s.  He denies polyuria, polydipsia, weight loss, numbness or tingling, and blurred vision.    > Decrease metformin to 1000 mg daily with AM meal.  Continue dulaglutide 0.75 mg weekly.  Continue close monitoring of fasting glucose sugars.  Continue diet and lifestyle measures.    Inability to maintain erection  This is a new condition.  He first noticed inability to maintain erection about 3-4 months ago.  He reports that is able to attain; however, he is unable to maintain and ejaculate even with self pleasure.    > Will trial  sildenafil.  He would like STD testing - ordered today.  Side effects discussed; advised on ER precautions.     Return in about 3 months (around 2/3/2022), or if symptoms worsen or fail to improve, for diabetes.    Health Maintenance: Completed    I have placed the below orders and discussed them with an approved delegating provider.  The MA is performing the below orders under the direction of Dr. Wilson..    Please note that this dictation was created using voice recognition software. I have worked with consultants from the vendor as well as technical experts from [facility] to optimize the interface. I have made every reasonable attempt to correct obvious errors, but I expect that there are errors of grammar and possibly content that I did not discover before finalizing the note.    YURY Holbrook  [facility]

## 2022-01-06 ENCOUNTER — TELEPHONE (OUTPATIENT)
Dept: PHYSICAL THERAPY | Facility: REHABILITATION | Age: 42
End: 2022-01-06

## 2022-01-06 NOTE — OP THERAPY DISCHARGE SUMMARY
Outpatient Physical Therapy  DISCHARGE SUMMARY NOTE      [facility]  [address], [address]  [facility] [address]  Phone:  [phone]  Fax:  [phone]    Date of Visit: 01/06/2022    Patient: Soham Newman  YOB: 1980  MRN: [id number]     Referring Provider: No referring provider defined for this encounter.   Referring Diagnosis Acute pain of right knee [M25.561]         Your patient is being discharged from Physical Therapy with the following comments:   · Patient has been non-compliant with physical therapy plan of care    Comments:  Soham Newman failed to return to the clinic following evaluation. He was last seen by Jen Whiting PT, DPT on 2/24/21. He has been discharged from physical therapy due to lapse of care greater than 30 days.       Limitations Remaining:  unknown    Recommendations:  F/u with PCP as needed    Harmeet Hyman, PT, DPT    Date: 1/6/2022

## 2022-01-11 ENCOUNTER — HOSPITAL ENCOUNTER (OUTPATIENT)
Dept: LAB | Facility: MEDICAL CENTER | Age: 42
End: 2022-01-11
Attending: NURSE PRACTITIONER
Payer: COMMERCIAL

## 2022-01-11 ENCOUNTER — OFFICE VISIT (OUTPATIENT)
Dept: MEDICAL GROUP | Facility: PHYSICIAN GROUP | Age: 42
End: 2022-01-11
Payer: COMMERCIAL

## 2022-01-11 VITALS
BODY MASS INDEX: 33.32 KG/M2 | WEIGHT: 246 LBS | HEIGHT: 72 IN | OXYGEN SATURATION: 98 % | HEART RATE: 75 BPM | SYSTOLIC BLOOD PRESSURE: 118 MMHG | RESPIRATION RATE: 12 BRPM | DIASTOLIC BLOOD PRESSURE: 78 MMHG | TEMPERATURE: 98.6 F

## 2022-01-11 DIAGNOSIS — E11.9 TYPE 2 DIABETES MELLITUS WITHOUT COMPLICATION, WITHOUT LONG-TERM CURRENT USE OF INSULIN (HCC): ICD-10-CM

## 2022-01-11 DIAGNOSIS — E11.65 TYPE 2 DIABETES MELLITUS WITH HYPERGLYCEMIA, WITHOUT LONG-TERM CURRENT USE OF INSULIN (HCC): ICD-10-CM

## 2022-01-11 DIAGNOSIS — L85.3 DRY SKIN: ICD-10-CM

## 2022-01-11 DIAGNOSIS — Z11.3 SCREEN FOR SEXUALLY TRANSMITTED DISEASES: ICD-10-CM

## 2022-01-11 DIAGNOSIS — R79.89 LOW TSH LEVEL: ICD-10-CM

## 2022-01-11 LAB
ALBUMIN SERPL BCP-MCNC: 4.5 G/DL (ref 3.2–4.9)
ALBUMIN/GLOB SERPL: 1.6 G/DL
ALP SERPL-CCNC: 70 U/L (ref 30–99)
ALT SERPL-CCNC: 23 U/L (ref 2–50)
ANION GAP SERPL CALC-SCNC: 8 MMOL/L (ref 7–16)
AST SERPL-CCNC: 20 U/L (ref 12–45)
BASOPHILS # BLD AUTO: 0.5 % (ref 0–1.8)
BASOPHILS # BLD: 0.03 K/UL (ref 0–0.12)
BILIRUB SERPL-MCNC: 0.6 MG/DL (ref 0.1–1.5)
BUN SERPL-MCNC: 15 MG/DL (ref 8–22)
C TRACH DNA SPEC QL NAA+PROBE: NEGATIVE
CALCIUM SERPL-MCNC: 9 MG/DL (ref 8.5–10.5)
CHLORIDE SERPL-SCNC: 100 MMOL/L (ref 96–112)
CHOLEST SERPL-MCNC: 162 MG/DL (ref 100–199)
CO2 SERPL-SCNC: 26 MMOL/L (ref 20–33)
CREAT SERPL-MCNC: 0.66 MG/DL (ref 0.5–1.4)
CREAT UR-MCNC: 117.19 MG/DL
EOSINOPHIL # BLD AUTO: 0.16 K/UL (ref 0–0.51)
EOSINOPHIL NFR BLD: 2.9 % (ref 0–6.9)
ERYTHROCYTE [DISTWIDTH] IN BLOOD BY AUTOMATED COUNT: 45.9 FL (ref 35.9–50)
EST. AVERAGE GLUCOSE BLD GHB EST-MCNC: 160 MG/DL
FASTING STATUS PATIENT QL REPORTED: NORMAL
GLOBULIN SER CALC-MCNC: 2.9 G/DL (ref 1.9–3.5)
GLUCOSE SERPL-MCNC: 143 MG/DL (ref 65–99)
HBA1C MFR BLD: 7.2 % (ref 4–5.6)
HCT VFR BLD AUTO: 47 % (ref 42–52)
HCV AB SER QL: NORMAL
HDLC SERPL-MCNC: 61 MG/DL
HGB BLD-MCNC: 16 G/DL (ref 14–18)
HIV 1+2 AB+HIV1 P24 AG SERPL QL IA: NORMAL
IMM GRANULOCYTES # BLD AUTO: 0.03 K/UL (ref 0–0.11)
IMM GRANULOCYTES NFR BLD AUTO: 0.5 % (ref 0–0.9)
LDLC SERPL CALC-MCNC: 84 MG/DL
LYMPHOCYTES # BLD AUTO: 2.31 K/UL (ref 1–4.8)
LYMPHOCYTES NFR BLD: 42.3 % (ref 22–41)
MCH RBC QN AUTO: 33 PG (ref 27–33)
MCHC RBC AUTO-ENTMCNC: 34 G/DL (ref 33.7–35.3)
MCV RBC AUTO: 96.9 FL (ref 81.4–97.8)
MICROALBUMIN UR-MCNC: <1.2 MG/DL
MICROALBUMIN/CREAT UR: NORMAL MG/G (ref 0–30)
MONOCYTES # BLD AUTO: 0.49 K/UL (ref 0–0.85)
MONOCYTES NFR BLD AUTO: 9 % (ref 0–13.4)
N GONORRHOEA DNA SPEC QL NAA+PROBE: NEGATIVE
NEUTROPHILS # BLD AUTO: 2.44 K/UL (ref 1.82–7.42)
NEUTROPHILS NFR BLD: 44.8 % (ref 44–72)
NRBC # BLD AUTO: 0 K/UL
NRBC BLD-RTO: 0 /100 WBC
PLATELET # BLD AUTO: 211 K/UL (ref 164–446)
PMV BLD AUTO: 9.7 FL (ref 9–12.9)
POTASSIUM SERPL-SCNC: 4.6 MMOL/L (ref 3.6–5.5)
PROT SERPL-MCNC: 7.4 G/DL (ref 6–8.2)
RBC # BLD AUTO: 4.85 M/UL (ref 4.7–6.1)
SODIUM SERPL-SCNC: 134 MMOL/L (ref 135–145)
SPECIMEN SOURCE: NORMAL
TREPONEMA PALLIDUM IGG+IGM AB [PRESENCE] IN SERUM OR PLASMA BY IMMUNOASSAY: NORMAL
TRIGL SERPL-MCNC: 84 MG/DL (ref 0–149)
TSH SERPL DL<=0.005 MIU/L-ACNC: 0.5 UIU/ML (ref 0.38–5.33)
WBC # BLD AUTO: 5.5 K/UL (ref 4.8–10.8)

## 2022-01-11 PROCEDURE — 87591 N.GONORRHOEAE DNA AMP PROB: CPT

## 2022-01-11 PROCEDURE — 36415 COLL VENOUS BLD VENIPUNCTURE: CPT

## 2022-01-11 PROCEDURE — 87389 HIV-1 AG W/HIV-1&-2 AB AG IA: CPT

## 2022-01-11 PROCEDURE — 80053 COMPREHEN METABOLIC PANEL: CPT

## 2022-01-11 PROCEDURE — 84443 ASSAY THYROID STIM HORMONE: CPT

## 2022-01-11 PROCEDURE — 85025 COMPLETE CBC W/AUTO DIFF WBC: CPT

## 2022-01-11 PROCEDURE — 86780 TREPONEMA PALLIDUM: CPT

## 2022-01-11 PROCEDURE — 99214 OFFICE O/P EST MOD 30 MIN: CPT | Performed by: NURSE PRACTITIONER

## 2022-01-11 PROCEDURE — 82570 ASSAY OF URINE CREATININE: CPT

## 2022-01-11 PROCEDURE — 80061 LIPID PANEL: CPT

## 2022-01-11 PROCEDURE — 86803 HEPATITIS C AB TEST: CPT

## 2022-01-11 PROCEDURE — 82043 UR ALBUMIN QUANTITATIVE: CPT

## 2022-01-11 PROCEDURE — 87491 CHLMYD TRACH DNA AMP PROBE: CPT

## 2022-01-11 PROCEDURE — 83036 HEMOGLOBIN GLYCOSYLATED A1C: CPT

## 2022-01-11 ASSESSMENT — PATIENT HEALTH QUESTIONNAIRE - PHQ9: CLINICAL INTERPRETATION OF PHQ2 SCORE: 0

## 2022-01-11 NOTE — ASSESSMENT & PLAN NOTE
Chronic, not controlled.  Improvement of A1c at last visit.  Continues to watch what he eats, stays active.  He reports he is currently remodeling his home so is engaging in moderate physical activity outside of work which is also physically demanding.  He does endorse that he has not been checking his fasting glucose levels over the last 3 to 4 weeks.  He does continue to take 1000 mg of metformin daily, Trulicity 0.75 mg weekly.  He denies any side effects or concerns.  He denies polyuria, polydipsia, weight loss, numbness or tingling, or blurred vision.  > POC A1c unavailable in clinic today.  Patient did have labs ordered at last visit, he will get these done today (did have coffee with little bit of cream this morning).  If A1c increased, increase Trulicity to 1.5 mg weekly; otherwise, refill at current dose of 0.75 mg after labs return.  Continue metformin 1000 mg daily.  Advised to resume taking glucose levels in the morning at least once a day before breakfast.  Continue diet lifestyle measures.

## 2022-01-11 NOTE — PROGRESS NOTES
Subjective  Chief Complaint  Chief Complaint   Patient presents with   • Follow-Up     History of Present Illness  Soham presents today with the following.  Problem   Dry Skin   Type 2 Diabetes Mellitus With Hyperglycemia, Without Long-Term Current Use of Insulin (Hcc)     Past Medical History    Allergies: Patient has no known allergies.  Past Medical History:   Diagnosis Date   • Cold    • Diabetes 12/09   • Diabetes 2010    Dr. Irizarry- type 2   • Diabetes (HCC)    • Heart burn    • Seizure (HCC)     as an infant     Current Outpatient Medications Ordered in Epic   Medication Sig Dispense Refill   • Dulaglutide 0.75 MG/0.5ML Solution Pen-injector Inject 0.5 mL under the skin every 7 days. 2 mL 5   • metformin (GLUCOPHAGE) 1000 MG tablet Take 1,000 mg by mouth every day.     • Blood Glucose Test Strips Test strips order: Test strips for Abbott Freestyle Lite meter. Sig: use daily and as needed for signs/symptoms of high or low blood sugar #100 RF x 3 100 Each 3   • FREESTYLE LITE strip USE TO TEST SUGARS ONCE DAILY     • Blood Glucose Monitoring Suppl (FREESTYLE LITE) Device USE AS DIRECTED TO CHECK BLOOD SUGAR DAILY     • glucose monitoring kit (FREESTYLE) monitoring kit Check sugars daily 1 Kit 0   • Lancets Lancets order: Lancets for Glucometer ordered meter. Sig: Check sugars once a day. #100 RF x 3 100 Each 11   • sildenafil citrate (VIAGRA) 50 MG tablet Take 1 Tablet by mouth as needed for Erectile Dysfunction. 10 Tablet 1     No current Epic-ordered facility-administered medications on file.     Review of Systems  See below.     Objective  Physical Exam  /78 (BP Location: Left arm, Patient Position: Sitting, BP Cuff Size: Large adult)   Pulse 75   Temp 37 °C (98.6 °F) (Temporal)   Resp 12   Ht 1.829 m (6')   Wt 112 kg (246 lb)   SpO2 98%  Body mass index is 33.36 kg/m².  General:  Alert and oriented.  Well appearing.  NAD  Neck: Supple without JVD. No lymphadenopathy.  Pulmonary:  Normal effort.   Clear to ausculation without rales, ronchi, or wheezing.  Cardiovascular:  Regular rate and rhythm without murmur, rubs or gallop.   Skin:  Warm and dry.  No obvious lesions.  Musculoskeletal:  No extremity cyanosis, clubbing, or edema.    Assessment/Plan  42 y.o. male with the following issues.    1. Type 2 diabetes mellitus with hyperglycemia, without long-term current use of insulin (HCC)     2. Dry skin        Type 2 diabetes mellitus with hyperglycemia, without long-term current use of insulin (HCC)  Chronic, not controlled.  Improvement of A1c at last visit.  Continues to watch what he eats, stays active.  He reports he is currently remodeling his home so is engaging in moderate physical activity outside of work which is also physically demanding.  He does endorse that he has not been checking his fasting glucose levels over the last 3 to 4 weeks.  He does continue to take 1000 mg of metformin daily, Trulicity 0.75 mg weekly.  He denies any side effects or concerns.  He denies polyuria, polydipsia, weight loss, numbness or tingling, or blurred vision.  > POC A1c unavailable in clinic today.  Patient did have labs ordered at last visit, he will get these done today (did have coffee with little bit of cream this morning).  If A1c increased, increase Trulicity to 1.5 mg weekly; otherwise, refill at current dose of 0.75 mg after labs return.  Continue metformin 1000 mg daily.  Advised to resume taking glucose levels in the morning at least once a day before breakfast.  Continue diet lifestyle measures.    Dry skin  New condition.  He reports dry, itchy skin during the winter.  He does endorse absentmindedly scratching his legs throughout the night; has rash intermittent to lower legs.  He has been using OTC cortisone cream to rash with relief.  He does endorse not applying moisturizer to body.   > Continue OTC cortisone cream to rash twice a day for up to two weeks.  Discussed importance of moisturizer to skin,  especially during winter months.      Return in about 3 months (around 4/11/2022), or if symptoms worsen or fail to improve.    I have placed the below orders and discussed them with an approved delegating provider.  The MA is performing the below orders under the direction of Dr. Wilson.    Please note that this dictation was created using voice recognition software. I have worked with consultants from the vendor as well as technical experts from [facility] to optimize the interface. I have made every reasonable attempt to correct obvious errors, but I expect that there are errors of grammar and possibly content that I did not discover before finalizing the note.    Jessica Shaw, YURY  [facility]

## 2022-01-12 DIAGNOSIS — E11.65 TYPE 2 DIABETES MELLITUS WITH HYPERGLYCEMIA, WITHOUT LONG-TERM CURRENT USE OF INSULIN (HCC): ICD-10-CM

## 2022-02-17 DIAGNOSIS — E11.65 TYPE 2 DIABETES MELLITUS WITH HYPERGLYCEMIA, WITHOUT LONG-TERM CURRENT USE OF INSULIN (HCC): ICD-10-CM

## 2022-02-17 RX ORDER — DULAGLUTIDE 0.75 MG/.5ML
INJECTION, SOLUTION SUBCUTANEOUS
Qty: 2 ML | Refills: 6 | Status: SHIPPED | OUTPATIENT
Start: 2022-02-17 | End: 2022-04-12

## 2022-04-12 ENCOUNTER — APPOINTMENT (OUTPATIENT)
Dept: RADIOLOGY | Facility: IMAGING CENTER | Age: 42
End: 2022-04-12
Attending: STUDENT IN AN ORGANIZED HEALTH CARE EDUCATION/TRAINING PROGRAM
Payer: COMMERCIAL

## 2022-04-12 ENCOUNTER — OFFICE VISIT (OUTPATIENT)
Dept: MEDICAL GROUP | Facility: PHYSICIAN GROUP | Age: 42
End: 2022-04-12
Payer: COMMERCIAL

## 2022-04-12 VITALS
HEART RATE: 78 BPM | SYSTOLIC BLOOD PRESSURE: 122 MMHG | OXYGEN SATURATION: 96 % | WEIGHT: 240 LBS | HEIGHT: 72 IN | BODY MASS INDEX: 32.51 KG/M2 | DIASTOLIC BLOOD PRESSURE: 80 MMHG | TEMPERATURE: 98.4 F

## 2022-04-12 DIAGNOSIS — Z76.89 ENCOUNTER TO ESTABLISH CARE: ICD-10-CM

## 2022-04-12 DIAGNOSIS — E66.9 CLASS 1 OBESITY WITH SERIOUS COMORBIDITY AND BODY MASS INDEX (BMI) OF 32.0 TO 32.9 IN ADULT, UNSPECIFIED OBESITY TYPE: ICD-10-CM

## 2022-04-12 DIAGNOSIS — M79.675 GREAT TOE PAIN, LEFT: ICD-10-CM

## 2022-04-12 DIAGNOSIS — G47.19 EXCESSIVE DAYTIME SLEEPINESS: ICD-10-CM

## 2022-04-12 DIAGNOSIS — S92.422A CLOSED DISPLACED FRACTURE OF DISTAL PHALANX OF LEFT GREAT TOE, INITIAL ENCOUNTER: ICD-10-CM

## 2022-04-12 DIAGNOSIS — R06.9 ABNORMAL BREATHING: ICD-10-CM

## 2022-04-12 DIAGNOSIS — R06.83 SNORING: ICD-10-CM

## 2022-04-12 DIAGNOSIS — E11.9 TYPE 2 DIABETES MELLITUS WITHOUT COMPLICATION, WITHOUT LONG-TERM CURRENT USE OF INSULIN (HCC): ICD-10-CM

## 2022-04-12 PROBLEM — E66.811 CLASS 1 OBESITY WITH SERIOUS COMORBIDITY AND BODY MASS INDEX (BMI) OF 32.0 TO 32.9 IN ADULT: Status: ACTIVE | Noted: 2022-04-12

## 2022-04-12 LAB
HBA1C MFR BLD: 6.9 % (ref 0–5.6)
INT CON NEG: ABNORMAL
INT CON POS: ABNORMAL

## 2022-04-12 PROCEDURE — 99214 OFFICE O/P EST MOD 30 MIN: CPT | Performed by: STUDENT IN AN ORGANIZED HEALTH CARE EDUCATION/TRAINING PROGRAM

## 2022-04-12 PROCEDURE — 73660 X-RAY EXAM OF TOE(S): CPT | Mod: TC,LT | Performed by: STUDENT IN AN ORGANIZED HEALTH CARE EDUCATION/TRAINING PROGRAM

## 2022-04-12 PROCEDURE — 83036 HEMOGLOBIN GLYCOSYLATED A1C: CPT | Performed by: STUDENT IN AN ORGANIZED HEALTH CARE EDUCATION/TRAINING PROGRAM

## 2022-04-12 RX ORDER — METFORMIN HYDROCHLORIDE 500 MG/1
1000 TABLET, EXTENDED RELEASE ORAL DAILY
Qty: 180 TABLET | Refills: 3 | Status: SHIPPED | OUTPATIENT
Start: 2022-04-12 | End: 2022-10-06 | Stop reason: SDUPTHER

## 2022-04-12 ASSESSMENT — FIBROSIS 4 INDEX: FIB4 SCORE: 0.79

## 2022-04-12 NOTE — PROGRESS NOTES
Subjective:     Chief Complaint   Patient presents with   • Establish Care   • Pain     Left great toe   • Medication Refill     trulicity     HISTORY OF THE PRESENT ILLNESS: Patient is a 42 y.o. male. This pleasant patient is here today to establish care and discuss left great toe pain and refill trulicity. His/her prior PCP was YURY Shaw.    Left great toe pain  After injury about 1m ago after kicking a block under a trailer. Was black and blue at first, still swollen. There are days where it is better, at times it throbs after walking. Taking motrin prn, has iced it.  Feels like he cannot move the distal portion of his toe compared to the right.    Type 2 diabetes mellitus without complication, without long-term current use of insulin ([facility])  Chronic. Taking trulicity 1.5mg weekly for about a year. Metformin 500mg 2 tablets daily-believes he is taking the immediate release not extended release.  Previously was on 2000 mg/day.  Denies any adverse side effects to either medication aside from itching after Trulicity in the injection site.  He denies any persistent numbness, tingling or pain in his feet aside from this great toe pain.  He previously was as much as 400 pounds but with dietary changes has lost weight, currently 240 pounds.  Overall eats well but admits to some dietary indiscretion.    Current Outpatient Medications Ordered in Epic   Medication Sig Dispense Refill   • Dulaglutide 1.5 MG/0.5ML Solution Pen-injector Inject 0.5 mL under the skin every 7 days. 6 mL 3   • metFORMIN ER (GLUCOPHAGE XR) 500 MG TABLET SR 24 HR Take 2 Tablets by mouth every day. 180 Tablet 3   • sildenafil citrate (VIAGRA) 50 MG tablet Take 1 Tablet by mouth as needed for Erectile Dysfunction. 10 Tablet 1   • Blood Glucose Test Strips Test strips order: Test strips for Abbott Freestyle Lite meter. Sig: use daily and as needed for signs/symptoms of high or low blood sugar #100 RF x 3 100 Each 3   • FREESTYLE LITE strip USE  TO TEST SUGARS ONCE DAILY     • Blood Glucose Monitoring Suppl (FREESTYLE LITE) Device USE AS DIRECTED TO CHECK BLOOD SUGAR DAILY     • glucose monitoring kit (FREESTYLE) monitoring kit Check sugars daily 1 Kit 0   • Lancets Lancets order: Lancets for Glucometer ordered meter. Sig: Check sugars once a day. #100 RF x 3 100 Each 11     No current Epic-ordered facility-administered medications on file.     ROS:   Gen: no fevers/chills  Eyes: no changes in vision  ENT: no sore throat  Pulm: no sob, no cough  CV: no chest pain, no palpitations  GI: no nausea/vomiting, no diarrhea  : no dysuria  Skin: no rash  Neuro: no headaches, numbness/tingling in feet at time with driving  Heme/Lymph: no easy bruising      Objective:     Exam: /80 (BP Location: Left arm, Patient Position: Sitting, BP Cuff Size: Large adult)   Pulse 78   Temp 36.9 °C (98.4 °F) (Temporal)   Ht 1.829 m (6')   Wt 109 kg (240 lb)   SpO2 96%  Body mass index is 32.55 kg/m².    General: Well developed, well nourished in no acute distress.  Head: Normocephalic and atraumatic.  Eyes: Conjunctivae and extraocular motions are normal. Pupils are equal, round. No scleral icterus.   Mouth/Throat: Wearing mask.  Neck: Supple. Thyroid is not enlarged.  Pulmonary: Clear to ausculation.  Normal effort. No rales, ronchi, or wheezing.  Cardiovascular: Regular rate and rhythm without murmur.   Abdomen: Soft, nontender, nondistended. Normal bowel sounds. No HSM.  Neurologic: No gross/focal deficits. Normal gait.   Lymph: No cervical or supraclavicular lymph nodes are palpable  Skin: Warm and dry.  No obvious lesions.  Musculoskeletal: No extremity cyanosis, clubbing, or edema. Left great toe with some edema without erythema, decreased active range of motion in plantarflexion distally.  No crepitus, increased warmth or point tenderness.  Psych: Normal mood and affect. Alert and oriented x3. Judgment and insight is normal.    Labs: Reviewed from  1/11/2022  Imaging:  Left great toe 4/12/22  FINDINGS:  There is a small displaced intra-articular fracture of the lateral corner of the first distal phalangeal base. There is soft tissue swelling of the great toe.  No other fracture or dislocation. Joint spaces are preserved.    IMPRESSION:  Small displaced intra-articular fracture of the lateral corner of the first distal phalangeal base    Assessment & Plan:   42 y.o. male with the following -    1. Encounter to establish care  Medical record reviewed and updated with patient.    2. Type 2 diabetes mellitus with hyperglycemia, without long-term current use of insulin (HCC)  Chronic, now well controlled. Continue on 1.5 mg Trulicity weekly and 1000 mg Metformin daily (switch to extended release as it is his preference to take once daily).  Recommend annual retinopathy screening, has a preference to go to his optometrist.  Repeat A1c in 6 months.  - Dulaglutide 1.5 MG/0.5ML Solution Pen-injector; Inject 0.5 mL under the skin every 7 days.  Dispense: 6 mL; Refill: 3  - metFORMIN ER (GLUCOPHAGE XR) 500 MG TABLET SR 24 HR; Take 2 Tablets by mouth every day.  Dispense: 180 Tablet; Refill: 3    3. Class 1 obesity with serious comorbidity and body mass index (BMI) of 32.0 to 32.9 in adult, unspecified obesity type  Max weight about 400lbs, has lost 6lbs since last visit. GLP-1 may be helping, will trend weight at f/u, continue lifestyle measures to improve.    4. Closed displaced fracture of distal phalanx of left great toe, initial encounter  5. Great toe pain, left  Acute, found to be intra-articular displaced fracture.  Urgent referral to orthopedics placed and patient given a short walking boot.  - DX-TOE(S) 2+ LEFT; Future  - Referral to Orthopedics    6. Snoring  7. Abnormal breathing  8. Excessive daytime sleepiness  These are chronic conditions, patient states that his girlfriend has not mentioned recent abnormal breathing but he had complained of it last year.   He was sent to the sleep medicine but unfortunately due to the cost he never completed the visit or sleep study.  Recommend he have this evaluated given ongoing symptoms.  - Referral to Pulmonary and Sleep Medicine    I spent a total of 36 minutes with record review, exam, communication with the patient, and documentation of this encounter.    Return in about 6 months (around 10/12/2022), or if symptoms worsen or fail to improve, for Diabetes.    Please note that this dictation was created using voice recognition software. I have made every reasonable attempt to correct obvious errors, but I expect that there are errors of grammar and possibly content that I did not discover before finalizing the note.

## 2022-04-12 NOTE — ASSESSMENT & PLAN NOTE
Chronic. Taking trulicity 1.5mg weekly for about a year. Metformin 500mg 2 tablets daily-believes he is taking the immediate release not extended release.  Previously was on 2000 mg/day.  Denies any adverse side effects to either medication aside from itching after Trulicity in the injection site.  He denies any persistent numbness, tingling or pain in his feet aside from this great toe pain.  He previously was as much as 400 pounds but with dietary changes has lost weight, currently 240 pounds.  Overall eats well but admits to some dietary indiscretion.

## 2022-08-10 DIAGNOSIS — E11.9 TYPE 2 DIABETES MELLITUS WITHOUT COMPLICATION, WITHOUT LONG-TERM CURRENT USE OF INSULIN (HCC): ICD-10-CM

## 2022-08-19 NOTE — ASSESSMENT & PLAN NOTE
New condition.  He reports dry, itchy skin during the winter.  He does endorse absentmindedly scratching his legs throughout the night; has rash intermittent to lower legs.  He has been using OTC cortisone cream to rash with relief.  He does endorse not applying moisturizer to body.   > Continue OTC cortisone cream to rash twice a day for up to two weeks.  Discussed importance of moisturizer to skin, especially during winter months.     Problem: Discharge Planning  Goal: Discharge to home or other facility with appropriate resources  Outcome: Progressing     Problem: Chronic Conditions and Co-morbidities  Goal: Patient's chronic conditions and co-morbidity symptoms are monitored and maintained or improved  Outcome: Progressing     Problem: Skin/Tissue Integrity  Goal: Absence of new skin breakdown  Description: 1. Monitor for areas of redness and/or skin breakdown  2. Assess vascular access sites hourly  3. Every 4-6 hours minimum:  Change oxygen saturation probe site  4. Every 4-6 hours:  If on nasal continuous positive airway pressure, respiratory therapy assess nares and determine need for appliance change or resting period.   Outcome: Progressing     Problem: Safety - Adult  Goal: Free from fall injury  Outcome: Progressing     Problem: Pain  Goal: Verbalizes/displays adequate comfort level or baseline comfort level  Outcome: Progressing     Problem: ABCDS Injury Assessment  Goal: Absence of physical injury  Outcome: Progressing

## 2022-09-06 DIAGNOSIS — E11.9 TYPE 2 DIABETES MELLITUS WITHOUT COMPLICATION, WITHOUT LONG-TERM CURRENT USE OF INSULIN (HCC): ICD-10-CM

## 2022-09-28 ENCOUNTER — APPOINTMENT (OUTPATIENT)
Dept: SLEEP MEDICINE | Facility: MEDICAL CENTER | Age: 42
End: 2022-09-28
Payer: COMMERCIAL

## 2022-10-06 ENCOUNTER — OFFICE VISIT (OUTPATIENT)
Dept: MEDICAL GROUP | Facility: PHYSICIAN GROUP | Age: 42
End: 2022-10-06
Payer: COMMERCIAL

## 2022-10-06 VITALS
DIASTOLIC BLOOD PRESSURE: 76 MMHG | SYSTOLIC BLOOD PRESSURE: 132 MMHG | HEART RATE: 75 BPM | WEIGHT: 241 LBS | HEIGHT: 72 IN | RESPIRATION RATE: 16 BRPM | BODY MASS INDEX: 32.64 KG/M2 | OXYGEN SATURATION: 98 % | TEMPERATURE: 97.8 F

## 2022-10-06 DIAGNOSIS — E11.65 TYPE 2 DIABETES MELLITUS WITH HYPERGLYCEMIA, WITHOUT LONG-TERM CURRENT USE OF INSULIN (HCC): ICD-10-CM

## 2022-10-06 DIAGNOSIS — L84 CALLUS OF FOOT: ICD-10-CM

## 2022-10-06 DIAGNOSIS — Z23 NEED FOR VACCINATION: ICD-10-CM

## 2022-10-06 LAB
HBA1C MFR BLD: 8.3 % (ref 0–5.6)
INT CON NEG: ABNORMAL
INT CON POS: ABNORMAL

## 2022-10-06 PROCEDURE — 90471 IMMUNIZATION ADMIN: CPT | Performed by: STUDENT IN AN ORGANIZED HEALTH CARE EDUCATION/TRAINING PROGRAM

## 2022-10-06 PROCEDURE — 90472 IMMUNIZATION ADMIN EACH ADD: CPT | Performed by: STUDENT IN AN ORGANIZED HEALTH CARE EDUCATION/TRAINING PROGRAM

## 2022-10-06 PROCEDURE — 90686 IIV4 VACC NO PRSV 0.5 ML IM: CPT | Performed by: STUDENT IN AN ORGANIZED HEALTH CARE EDUCATION/TRAINING PROGRAM

## 2022-10-06 PROCEDURE — 90746 HEPB VACCINE 3 DOSE ADULT IM: CPT | Performed by: STUDENT IN AN ORGANIZED HEALTH CARE EDUCATION/TRAINING PROGRAM

## 2022-10-06 PROCEDURE — 83036 HEMOGLOBIN GLYCOSYLATED A1C: CPT | Performed by: STUDENT IN AN ORGANIZED HEALTH CARE EDUCATION/TRAINING PROGRAM

## 2022-10-06 PROCEDURE — 99214 OFFICE O/P EST MOD 30 MIN: CPT | Mod: 25 | Performed by: STUDENT IN AN ORGANIZED HEALTH CARE EDUCATION/TRAINING PROGRAM

## 2022-10-06 PROCEDURE — 90677 PCV20 VACCINE IM: CPT | Performed by: STUDENT IN AN ORGANIZED HEALTH CARE EDUCATION/TRAINING PROGRAM

## 2022-10-06 RX ORDER — METFORMIN HYDROCHLORIDE 500 MG/1
2000 TABLET, EXTENDED RELEASE ORAL DAILY
Qty: 360 TABLET | Refills: 3 | Status: SHIPPED | OUTPATIENT
Start: 2022-10-06 | End: 2023-09-26

## 2022-10-06 ASSESSMENT — FIBROSIS 4 INDEX: FIB4 SCORE: 0.79

## 2022-10-06 NOTE — PROGRESS NOTES
Subjective:     Chief Complaint   Patient presents with    Diabetes Follow-up     3 month f/u. Trullicity refill      HPI:   Soham presents today with    Type 2 diabetes mellitus with hyperglycemia, without long-term current use of insulin (HCC)  Chronic. Taking trulicity 1.5mg weekly for about a year, but [facility]. Not eating as well since last appointment.    Metformin 500mg 2 tablets daily extended release. Prior on 2000mg daily.   He previously was as much as 400 pounds but with dietary changes has lost weight, currently 241 pounds.    Current Outpatient Medications Ordered in Epic   Medication Sig Dispense Refill    metFORMIN ER (GLUCOPHAGE XR) 500 MG TABLET SR 24 HR Take 4 Tablets by mouth every day. 360 Tablet 3    Dulaglutide 1.5 MG/0.5ML Solution Pen-injector Inject 0.5 mL under the skin every 7 days. 2 mL 6    sildenafil citrate (VIAGRA) 50 MG tablet Take 1 Tablet by mouth as needed for Erectile Dysfunction. 10 Tablet 1    Blood Glucose Test Strips Test strips order: Test strips for Abbott Freestyle Lite meter. Sig: use daily and as needed for signs/symptoms of high or low blood sugar #100 RF x 3 100 Each 3    FREESTYLE LITE strip USE TO TEST SUGARS ONCE DAILY      Blood Glucose Monitoring Suppl (FREESTYLE LITE) Device USE AS DIRECTED TO CHECK BLOOD SUGAR DAILY      glucose monitoring kit (FREESTYLE) monitoring kit Check sugars daily 1 Kit 0    Lancets Lancets order: Lancets for Glucometer ordered meter. Sig: Check sugars once a day. #100 RF x 3 100 Each 11     No current [facility]-ordered facility-administered medications on file.     ROS:  Gen: no fevers/chills, no changes in weight  Eyes: no changes in vision  ENT: no sore throat  Pulm: no sob, no cough  CV: no chest pain, no palpitations  GI: no nausea/vomiting, no diarrhea  MSk: no myalgias  Skin: no rash  Neuro: no headaches, no numbness/tingling  Heme/Lymph: no easy bruising    Objective:      Exam:  /76 (BP Location: Left arm, Patient Position: Sitting, BP Cuff Size: Adult)   Pulse 75   Temp 36.6 °C (97.8 °F) (Temporal)   Resp 16   Ht 1.829 m (6')   Wt 109 kg (241 lb)   SpO2 98%   BMI 32.69 kg/m²  Body mass index is 32.69 kg/m².    Physical Exam:  Constitutional: Well-developed and well-nourished. No acute distress.   Skin: Skin is warm and dry.  Mild callus bilteral great toe medial  Head: Atraumatic without lesions.  Eyes: Conjunctivae and extraocular motions are normal. Pupils are equal, round. No scleral icterus.   Mouth/Throat: Wearing mask.  Neck: Supple, trachea midline.   Lungs: Normal inspiratory effort  Extremities: No cyanosis, clubbing, erythema, nor edema.  Monofilament testing with a 10 gram force: sensation intact: intact bilaterally  Visual Inspection: Feet without maceration, ulcers, fissures.  Pedal pulses: intact bilaterally  Neurological: Alert and oriented x 3. No gross/focal deficits.  Psychiatric:  Behavior, mood, and affect are appropriate.    Assessment & Plan:     42 y.o. male with the following -     1. Type 2 diabetes mellitus with hyperglycemia, without long-term current use of insulin (HCC)  Chronic, not controlled.  Will increase metformin to max dose as below.  We will continue Trulicity at current dose 1.5 mg weekly.  Contacted his pharmacy today and confirmed that he with insurance is only allowed 30-day supply at the pharmacy which is affecting his medication compliance.  Instructed patient to contact his insurance to see if we can can consider something like mail order so he can receive a 90-day supply as previously his diabetes was well controlled on this regimen.  - A1C in 3 months.  In the meantime advised to work on dietary changes to improve as well.  - Annual labs ordered for 1/2023.  - Retinopathy screening UTD, records requested.  - POCT A1C  - metFORMIN ER (GLUCOPHAGE XR) 500 MG TABLET SR 24 HR; Take 4 Tablets by mouth every day.  Dispense:  360 Tablet; Refill: 3  - Dulaglutide 1.5 MG/0.5ML Solution Pen-injector; Inject 0.5 mL under the skin every 7 days.  Dispense: 2 mL; Refill: 6  - Comp Metabolic Panel; Future  - Lipid Profile; Future  - CBC WITH DIFFERENTIAL; Future  - MICROALBUMIN CREAT RATIO URINE; Future  - HEMOGLOBIN A1C; Future    2. Callus of foot  Referred to podiatry for diabetic foot care.  - Referral to Podiatry    3. Need for vaccination  - INFLUENZA VACCINE QUAD INJ (PF)  - Pneumococcal Conjugate Vaccine 20-Valent (19 yrs+)  - Hep B Adult 20+    Return in about 3 months (around 1/17/2023), or if symptoms worsen or fail to improve, for Diabetes.    Please note that this dictation was created using voice recognition software. I have made every reasonable attempt to correct obvious errors, but I expect that there are errors of grammar and possibly content that I did not discover before finalizing the note.

## 2022-10-06 NOTE — LETTER
[facility]  Heena Roper D.O.  [address]  [place] [address]  Fax: [phone]   Authorization for Release/Disclosure of   Protected Health Information   Name: SOHAM NEWMAN : 1980 SSN: [id number]   Address: [id number] [place] [address] [place] [address] Phone:    [phone] (home)    I authorize the entity listed below to release/disclose the PHI below to:   [facility]/Heena Roper D.O. and Heena Roper D.O.   Provider or Entity Name:     Address   City, State, [facility]   Phone:      Fax:     Reason for request: continuity of care   Information to be released:    [  ] LAST COLONOSCOPY,  including any PATH REPORT and follow-up  [  ] LAST FIT/COLOGUARD RESULT [  ] LAST DEXA  [  ] LAST MAMMOGRAM  [  ] LAST PAP  [  ] LAST LABS [  ] RETINA EXAM REPORT  [  ] IMMUNIZATION RECORDS  [  ] Release all info      [  ] Check here and initial the line next to each item to release ALL health information INCLUDING  _____ Care and treatment for drug and / or alcohol abuse  _____ HIV testing, infection status, or AIDS  _____ Genetic Testing    DATES OF SERVICE OR TIME PERIOD TO BE DISCLOSED: _____________  I understand and acknowledge that:  * This Authorization may be revoked at any time by you in writing, except if your health information has already been used or disclosed.  * Your health information that will be used or disclosed as a result of you signing this authorization could be re-disclosed by the recipient. If this occurs, your re-disclosed health information may no longer be protected by State or Federal laws.  * You may refuse to sign this Authorization. Your refusal will not affect your ability to obtain treatment.  * This Authorization becomes effective upon signing and will  on (date) __________.      If no date is indicated, this Authorization will  one (1) year from the signature date.    Name: Soham Newman    Signature:   Date:     10/6/2022       PLEASE  FAX REQUESTED RECORDS BACK TO: [phone]

## 2022-10-06 NOTE — ASSESSMENT & PLAN NOTE
Chronic. Taking trulicity 1.5mg weekly for about a year, but had been out for a month in [facility] due to insurance issues. Not eating as well since last appointment.    Metformin 500mg 2 tablets daily extended release. Prior on 2000mg daily.   He previously was as much as 400 pounds but with dietary changes has lost weight, currently 241 pounds.

## 2022-10-06 NOTE — PATIENT INSTRUCTIONS
Call about mail order for [facility]  Fasting labs on or after 1/6 (needs to be >90 days from last A1C)

## 2022-10-17 ENCOUNTER — PATIENT MESSAGE (OUTPATIENT)
Dept: MEDICAL GROUP | Facility: PHYSICIAN GROUP | Age: 42
End: 2022-10-17
Payer: COMMERCIAL

## 2022-10-17 DIAGNOSIS — E11.65 TYPE 2 DIABETES MELLITUS WITH HYPERGLYCEMIA, WITHOUT LONG-TERM CURRENT USE OF INSULIN (HCC): ICD-10-CM

## 2022-10-20 DIAGNOSIS — E11.65 TYPE 2 DIABETES MELLITUS WITH HYPERGLYCEMIA, WITHOUT LONG-TERM CURRENT USE OF INSULIN (HCC): ICD-10-CM

## 2022-11-16 ENCOUNTER — OFFICE VISIT (OUTPATIENT)
Dept: SLEEP MEDICINE | Facility: MEDICAL CENTER | Age: 42
End: 2022-11-16
Payer: COMMERCIAL

## 2022-11-16 VITALS
HEIGHT: 72 IN | BODY MASS INDEX: 31.83 KG/M2 | WEIGHT: 235 LBS | OXYGEN SATURATION: 98 % | SYSTOLIC BLOOD PRESSURE: 120 MMHG | DIASTOLIC BLOOD PRESSURE: 76 MMHG | HEART RATE: 83 BPM | RESPIRATION RATE: 14 BRPM

## 2022-11-16 DIAGNOSIS — R06.81 WITNESSED EPISODE OF APNEA: ICD-10-CM

## 2022-11-16 DIAGNOSIS — F51.04 CHRONIC INSOMNIA: ICD-10-CM

## 2022-11-16 DIAGNOSIS — G47.19 EXCESSIVE DAYTIME SLEEPINESS: ICD-10-CM

## 2022-11-16 DIAGNOSIS — G47.30 SLEEP DISORDER BREATHING: Primary | ICD-10-CM

## 2022-11-16 PROCEDURE — 99204 OFFICE O/P NEW MOD 45 MIN: CPT | Performed by: STUDENT IN AN ORGANIZED HEALTH CARE EDUCATION/TRAINING PROGRAM

## 2022-11-16 ASSESSMENT — FIBROSIS 4 INDEX: FIB4 SCORE: 0.79

## 2022-11-16 NOTE — PROGRESS NOTES
[facility] Sleep Center Consult Note     Date: 11/16/2022 / Time: 10:26 AM      Thank you for requesting a sleep medicine consultation on Soham Newman at the sleep center. Presents today with the chief complaints of snoring, witnessed apneas excessive daytime sleepiness. He is referred by No referring provider defined for this encounter. for evaluation and treatment of sleep disorder breathing .     HISTORY OF PRESENT ILLNESS:     Soham Newman is a 42 y.o. male with type 2 diabetes mellitus, excessive daytime sleepiness, snoring, and obesity.  Presents to Sleep Clinic for evaluation of sleep.     Reports for years she had trouble with sleep.  States he has had trouble staying asleep.  There will be times where he will wake up and he is unable to get back to sleep.  He is also been told that he snores in his sleep.  His fiancée over the last year has told him that he does have pauses in his breathing.  This will lead him to gasping in the night.  For the most part he is unaware of these episodes.  However if he does wake up at all he generally does not get back to sleep.  This happens 3 or more times a week.  He will wake up earlier than what he would like.  His alarm is set between 530 and 6 AM.  However there are days where he will wake up as early as 3.    He does not find his sleep restorative.  He is tired at times throughout the day.      As per supplemental questionnaire to be scanned or imported into chart:    [place] Sleepiness Score: 12    Sleep Schedule  Bedtime: Weekday 9pm  Weekend 9pm   Wake time: Weekday 3-530am Weekend 3-530am   Sleep-onset latency: 10-15min   Awakenings from sleep: 0-1   Difficulty falling back asleep: yes once awake cant get back to sleep   Bedroom partner: Yes  Naps: No     DAYTIME SYMPTOMS:   Excessive daytime sleepiness: Yes  Daytime fatigue: Yes  Difficulty concentrating: Yes  Memory problems: Yes  Irritability:Yes  Work/school performance issues: No  "  Sleepiness with driving: Yes on long drives  Caffeine/stimulant use: Yes  Alcohol use:Yes, How Many? Once a month      SLEEP RELATED SYMPTOMS  Snoring: Yes  Witnessed apnea or gasping/choking: Yes   Dry mouth or mouth breathing: Yes, mouth breathing   Sweating: Yes sometimes   Teeth grinding/biting: No   Morning headaches: No   Refreshed Upon Awakening: No      SLEEP RELATED BEHAVIORS:  Parasomnias (walking, talking, eating, violence): No   Leg kicking: No   Restless legs - \"urge to move\": No   Nightmares: No  Recurrent: No   Dream enactment: No      NARCOLEPSY:  Cataplexy: No   Sleep paralysis: No   Sleep attacks: No   Hypnagogic/hypnopompic hallucinations: No     MEDICAL HISTORY  Past Medical History:   Diagnosis Date    Cold     Diabetes 12/09    Diabetes 2010    Dr. Irizarry- type 2    Diabetes (HCC)     Heart burn     Seizure (HCC)     as an infant        SURGICAL HISTORY  Past Surgical History:   Procedure Laterality Date    CIRCUMCISION ADULT  1/12/2010    Performed by RYAN KRUEGER at SURGERY [facility] ORS    OTHER  2010    adult circumcision        FAMILY HISTORY  Family History   Problem Relation Age of Onset    Diabetes Paternal Grandfather     Diabetes Mother     Diabetes Father     Diabetes Brother     Diabetes Brother     Diabetes Maternal Grandmother        SOCIAL HISTORY  Social History     Socioeconomic History    Marital status: Single   Occupational History    Occupation: [employer]     Employer: [employer]   Tobacco Use    Smoking status: Every Day     Types: Cigars    Smokeless tobacco: Never    Tobacco comments:     2 daily   Vaping Use    Vaping Use: Never used   Substance and Sexual Activity    Alcohol use: Yes     Comment: rarely    Drug use: Yes     Comment: marajuana    Sexual activity: Yes     Partners: Female   Social History Narrative    ** Merged History Encounter **         ** Merged History Encounter **         , 2 sons        Occupation: Superintendent for housing, " 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 6 mL 3    metFORMIN ER (GLUCOPHAGE XR) 500 MG TABLET SR 24 HR Take 4 Tablets by mouth every day. 360 Tablet 3    Blood Glucose Test Strips Test strips order: Test strips for Abbott Freestyle Lite meter. Sig: use daily and as needed for signs/symptoms of high or low blood sugar #100 RF x 3 100 Each 3    FREESTYLE LITE strip USE TO TEST SUGARS ONCE DAILY      Blood Glucose Monitoring Suppl (FREESTYLE LITE) Device USE AS DIRECTED TO CHECK BLOOD SUGAR DAILY      glucose monitoring kit (FREESTYLE) monitoring kit Check sugars daily 1 Kit 0    Lancets Lancets order: Lancets for Glucometer ordered meter. Sig: Check sugars once a day. #100 RF x 3 100 Each 11    sildenafil citrate (VIAGRA) 50 MG tablet Take 1 Tablet by mouth as needed for Erectile Dysfunction. (Patient not taking: Reported on 11/16/2022) 10 Tablet 1     No current facility-administered medications for this visit.       REVIEW OF SYSTEMS  Constitutional: Denies fevers, Denies weight changes  Ears/Nose/Throat/Mouth: Denies nasal congestion or sore throat   Cardiovascular: Denies chest pain  Respiratory: Denies shortness of breath, Denies cough  Gastrointestinal/Hepatic: Denies nausea, vomiting  Sleep: see HPI    Physical Examination:  Vitals/ General Appearance:   Weight/BMI: Body mass index is 31.87 kg/m².  /76 (BP Location: Left arm, Patient Position: Sitting, BP Cuff Size: Large adult)   Pulse 83   Resp 14   Ht 1.829 m (6')   Wt 107 kg (235 lb)   SpO2 98%   Vitals:    11/16/22 1019   BP: 120/76   BP Location: Left arm   Patient Position: Sitting   BP Cuff Size: Large adult   Pulse: 83   Resp: 14   SpO2: 98%   Weight: 107 kg (235 lb)   Height: 1.829 m (6')       Pt. is alert and oriented to time, place and person. Cooperative and in no apparent distress.      Constitutional: Alert, no distress, well-groomed.  Skin: No rashes in visible areas.  Eye: Round. Conjunctiva clear, lids normal. No icterus.   ENT EXAM  Nasal alae/valves collapsible: No   Nasal septum deviation: No   Nasal turbinate hypertrophy: Left: Grade 1   Right: Grade 1  Hard palate narrow: No   Hard palate high: No   Soft palate/uvula (Mallampati score): 3  Tongue Scalloping: Yes  Retrognathia: No   Micrognathia: No   Cardiovascular:no murmus/gallops/rubs, normal S1 and S2 heart sounds, regular rate and rhythm  Pulmonary:Clear to auscultation, No wheezes, No crackles.  Neurologic:Awake, alert and oriented x 3, Normal age appropriate gait, No involuntary motions.  Extremities: No clubbing, cyanosis, or edema       ASSESSMENT AND PLAN   Soham Newman is a 42 y.o. male with type 2 diabetes mellitus, excessive daytime sleepiness, snoring, and obesity.  Presents to Sleep Clinic for evaluation of sleep.     1. Soham Newman  has symptoms of Obstructive Sleep Apnea (MARK). Soham Newman has symptoms of snoring, choking/gasping during sleep, witnessed apnea, dry mouth, mouth breathing, unrefreshed upon awakening, brain fog, irritability, excessive daytime sleepiness. These  interfere with activities of daily living.   ESS 12  Pt has risk factors for MARK include obesity, and crowded oropharynx.     The pathophysiology of MARK and the increased risk of cardiovascular morbidity from untreated MARK is discussed in detail with the patient. He  also has type 2 diabetes mellitus which can be worsened by MARK.      We have discussed diagnostic options including in-laboratory, attended polysomnography and home sleep testing. We have also discussed treatment options including airway pressurization, reconstructive otolaryngologic surgery, dental appliances and weight management.     Subsequently,treatment options will be discussed based on the diagnostic study. Meanwhile, He is urged to avoid supine  sleep, weight gain and alcoholic beverages since all of these can worsen MARK. He is cautioned against drowsy driving. If He feels sleepy while driving, advised must pull over for a break/nap, rather than persist on the road, in the interest of Pt's own safety and that of others on the road.    Plan  -  He  will be scheduled for an overnight  HST to assess sleep related breathing disorder.  - Follow up 1-2 weeks after sleep study to discuss results and treatment options moving forward   -Advised to reach out via [employer] or by phone with any questions or concerns.     2.  Chronic Insomnia   With awakenings with difficulty falling back asleep and feeling this is impacting daily functioning for years meets criteria for chronic insomnia. Discussed potential causes of insomnia and importance of having a routine around bedtime. Advised to avoid laying in bed for more then 20-30 min if unable to fall asleep.  Advised that untreated sleep apnea can worsen sleep maintenance insomnia.  Disruptions to his breathing may be waking him up and if he is awake he cannot get back to sleep.    RECOMMENDATIONS  -Maintain a regular sleep schedule  -Avoid caffeinated beverages after lunch, and alcohol in late afternoon and evening  -Avoid prolonged use of light-emitting screens before bedtime  -Exercise regularly for at least 20 minutes, preferably more than four to five hours prior to bedtime  -Avoid daytime naps, especially if they are longer than 20 to 30 minutes or occur late in the day  -Advised to contact our office or myself with any questions via [employer]    Regarding treatment of other past medical problems and general health maintenance,  Pt is urged to follow up with PCP.      Please note portions of this record was created using voice recognition software. I have made every reasonable attempt to correct obvious errors, but I expect that there are errors of grammar and possibly content I did not discover before finalizing the  note.

## 2022-12-20 ENCOUNTER — HOME STUDY (OUTPATIENT)
Dept: SLEEP MEDICINE | Facility: MEDICAL CENTER | Age: 42
End: 2022-12-20
Attending: STUDENT IN AN ORGANIZED HEALTH CARE EDUCATION/TRAINING PROGRAM
Payer: COMMERCIAL

## 2022-12-20 DIAGNOSIS — R06.81 WITNESSED EPISODE OF APNEA: ICD-10-CM

## 2022-12-20 DIAGNOSIS — G47.19 EXCESSIVE DAYTIME SLEEPINESS: ICD-10-CM

## 2022-12-20 DIAGNOSIS — F51.04 CHRONIC INSOMNIA: ICD-10-CM

## 2022-12-20 DIAGNOSIS — G47.30 SLEEP DISORDER BREATHING: ICD-10-CM

## 2022-12-20 PROCEDURE — 95806 SLEEP STUDY UNATT&RESP EFFT: CPT | Performed by: STUDENT IN AN ORGANIZED HEALTH CARE EDUCATION/TRAINING PROGRAM

## 2023-01-09 NOTE — PROCEDURES
DIAGNOSTIC HOME SLEEP TEST (HST) REPORT       PATIENT ID:  NAME:  Soham Newman  MRN:               [id number]  YOB: 1980  DATE OF STUDY: 12/20/2022      Impression:     This study shows evidence of:     1. Severe obstructive sleep apnea with  Respiratory Event Index (LUCY) of 32.3 per hour and worse in supine sleep with LUCY at 32.5. These findings are based on the recording time (flow evaluation time). It is not possible with this device to determine a traditional apnea+hypopnea index (AHI) for total sleep time since EEG channels are not available.     2. Oxygenation O2 Sat. keon was 82% and mean O2 sat was 90% and baseline O2 at 93 %. O2 sat was below 88% for 54 min of the flow evaluation time. Oxygen Desaturation (>=3%) Index was elevated at 31.9/hr. AVG HR was 72 BPM.      TECHNICAL DESCRIPTION: Pogoapp apnea link air device used was a type-III home study device. Home sleep study recording included: Airflow recording by nasal pressure transducer; Respiratory Effort by 1 RIP Band; Oxygen Saturation and heart rate by finger oximetry. A position sensor and a snore channel was also used.    Scoring Criteria: A modification of the the AASM Manual for the Scoring of Sleep and Associated Events, 2012, was used.   Obstructive apnea was scored by cessation of airflow for at least 10 seconds with continuing respiratory effort.  Central apnea was scored by cessation of airflow for at least 10 seconds with no effort.  Hypopnea was scored by a 30% or more reduction in airflow for at least 10 seconds accompanied by an arterial oxygen desaturation of 3% or more.  (For Medicare patients, hypopneas were scored by a 30% or more reduction in airflow for at least 10 seconds accompanied by an arterial oxygen saturation of 4% or more, as required by their insurance, CMS.        General sleep summary: . Total recording time is 6 hours and 15 minutes and total flow evaluation time is 5 hours and 58 minutes.  The patient spent 5 hours and 36 minutes in the supine position and 0 hours and 22 minutes in the nonsupine position.    Respiratory events:    Apneas: Total 34 (Obstructive apnea index 5.5/hr, Central apnea index 0.2 /hr, mixed 0 /hour)  Hypopneas: Total 159 with a Hypopnea index of 26.6 /hr    Recommendations:    1. CPAP titration study vs Auto CPAP trial .   2.   In general patients with sleep apnea are advised to avoid alcohol and sedatives and to not operate a motor vehicle while drowsy. In some cases alternative treatment options may prove effective in resolving sleep apnea in these options include upper airway surgery, the use of a dental orthotic or weight loss and positional therapy. Clinical correlation is required.         Sachin Villar MD

## 2023-02-01 ENCOUNTER — OFFICE VISIT (OUTPATIENT)
Dept: SLEEP MEDICINE | Facility: MEDICAL CENTER | Age: 43
End: 2023-02-01
Payer: COMMERCIAL

## 2023-02-01 VITALS
HEART RATE: 67 BPM | SYSTOLIC BLOOD PRESSURE: 114 MMHG | WEIGHT: 232 LBS | RESPIRATION RATE: 16 BRPM | HEIGHT: 72 IN | OXYGEN SATURATION: 95 % | BODY MASS INDEX: 31.42 KG/M2 | DIASTOLIC BLOOD PRESSURE: 76 MMHG

## 2023-02-01 DIAGNOSIS — G47.33 OSA (OBSTRUCTIVE SLEEP APNEA): Primary | ICD-10-CM

## 2023-02-01 DIAGNOSIS — G47.19 EXCESSIVE DAYTIME SLEEPINESS: ICD-10-CM

## 2023-02-01 PROCEDURE — 99213 OFFICE O/P EST LOW 20 MIN: CPT | Performed by: STUDENT IN AN ORGANIZED HEALTH CARE EDUCATION/TRAINING PROGRAM

## 2023-02-01 ASSESSMENT — FIBROSIS 4 INDEX: FIB4 SCORE: 0.81

## 2023-02-01 NOTE — PROGRESS NOTES
[facility] Follow-up Visit    CC: Follow-up to discuss sleep study results      HPI:  Soham Newman is a 43 y.o.male  with type 2 diabetes mellitus, daytime sleepiness, obesity, and severe obstructive sleep apnea.  Presents today to discuss sleep study results.    He reports neither sleep study is not the best night sleep.  Did get a decent night sleep.  He is concerned regarding his study results.  States he did review the study results prior to today's visit and he has him concerned.    At last visit it was discussed that he is excessively sleepy with [place] Sleepiness Scale of 12.  He was complaining of snoring in his sleep as well as having gasping and trouble breathing in sleep.  He was not finding his sleep restorative.  This continues to be the case.    Sleep History  12/20/2022 HST showed severe obstructive sleep apnea with overall AHI of 32, minimum oxygen saturation 82%, time at or below 88% saturation 54 minutes    Patient Active Problem List    Diagnosis Date Noted    Callus of foot 10/06/2022    Class 1 obesity with serious comorbidity and body mass index (BMI) of 32.0 to 32.9 in adult 04/12/2022    Snoring 04/12/2022    Abnormal breathing 04/12/2022    Excessive daytime sleepiness 04/12/2022    Dry skin 01/11/2022    Erectile dysfunction 11/03/2021    Type 2 diabetes mellitus with hyperglycemia, without long-term current use of insulin (HCC) 01/13/2021    Acute pain of right knee 01/13/2021       Past Medical History:   Diagnosis Date    Cold     Diabetes 12/09    Diabetes 2010    Dr. Irizarry- type 2    Diabetes (HCC)     Heart burn     Seizure (HCC)     as an infant        Past Surgical History:   Procedure Laterality Date    CIRCUMCISION ADULT  1/12/2010    Performed by RYAN KRUEGER at SURGERY [facility]    OTHER  2010    adult circumcision       Family History   Problem Relation Age of Onset    Diabetes Paternal Grandfather     Diabetes Mother     Diabetes Father     Diabetes  Brother     Diabetes Brother     Diabetes Maternal Grandmother        Social History     Socioeconomic History    Marital status: Single     Spouse name: Not on file    Number of children: Not on file    Years of education: Not on file    Highest education level: Not on file   Occupational History    Occupation:      Employer: [employer]   Tobacco Use    Smoking status: Every Day     Types: Cigars    Smokeless tobacco: Never    Tobacco comments:     2 daily   Vaping Use    Vaping Use: Never used   Substance and Sexual Activity    Alcohol use: Yes     Comment: rarely    Drug use: Yes     Comment: mararadha daily    Sexual activity: Yes     Partners: Female   Other Topics Concern    Not on file   Social History Narrative    ** Merged History Encounter **         ** Merged History Encounter **         , 2 sons     Social Determinants of Health     Financial Resource Strain: Not on file   Food Insecurity: Not on file   Transportation Needs: Not on file   Physical Activity: Not on file   Stress: Not on file   Social Connections: Not on file   Intimate Partner Violence: Not on file   Housing Stability: Not on file       Current Outpatient Medications   Medication Sig Dispense Refill    Dulaglutide 1.5 MG/0.5ML Solution Pen-injector Inject 0.5 mL under the skin every 7 days. 6 mL 3    metFORMIN ER (GLUCOPHAGE XR) 500 MG TABLET SR 24 HR Take 4 Tablets by mouth every day. 360 Tablet 3    Blood Glucose Test Strips Test strips order: Test strips for Abbott Freestyle Lite meter. Sig: use daily and as needed for signs/symptoms of high or low blood sugar #100 RF x 3 100 Each 3    FREESTYLE LITE strip USE TO TEST SUGARS ONCE DAILY      Blood Glucose Monitoring Suppl (FREESTYLE LITE) Device USE AS DIRECTED TO CHECK BLOOD SUGAR DAILY      glucose monitoring kit (FREESTYLE) monitoring kit Check sugars daily 1 Kit 0    Lancets Lancets order: Lancets for Glucometer ordered meter. Sig: Check sugars once a day. #100 RF x  3 100 Each 11    sildenafil citrate (VIAGRA) 50 MG tablet Take 1 Tablet by mouth as needed for Erectile Dysfunction. (Patient not taking: Reported on 11/16/2022) 10 Tablet 1     No current facility-administered medications for this visit.        ALLERGIES: Patient has no known allergies.    ROS  Constitutional: Denies fevers, Denies weight changes  Ears/Nose/Throat/Mouth: Denies nasal congestion or sore throat   Cardiovascular: Denies chest pain  Respiratory: Denies shortness of breath, Denies cough  Gastrointestinal/Hepatic: Denies nausea, vomiting  Sleep: see HPI      PHYSICAL EXAM  /76 (BP Location: Left arm, Patient Position: Sitting, BP Cuff Size: Adult)   Pulse 67   Resp 16   Ht 1.829 m (6')   Wt 105 kg (232 lb)   SpO2 95%   BMI 31.46 kg/m²   Appearance: Well-nourished, well-developed, no acute distress  Eyes:  No scleral icterus , EOMI  ENMT: masked  Musculoskeletal:  Grossly normal; gait and station normal; digits and nails normal  Skin:  No rashes, petechiae, cyanosis  Neurologic: without focal signs; oriented to person, time, place, and purpose; judgement intact      Medical Decision Making   Assessment and Plan  Soham Newman is a 43 y.o.male  with type 2 diabetes mellitus, daytime sleepiness, obesity, and severe obstructive sleep apnea.  Presents today to discuss sleep study results.    The medical record was reviewed.    Obstructive sleep apnea   Reviewed recent HST with patient showing an AHI of 32.3, and Min Oxygen saturation of 82%.  Time at 4 below 88% saturation of 54 minutes.  Based on sleep study and symptoms meets criteria for sleep obstructive sleep apnea.   We discussed the pathophysiology of obstructive sleep apnea (MARK) and risk factors for the disease. We also discussed possible consequences of untreated MARK, including excessive daytime sleepiness and fatigue, cognitive dysfunction, cardiovascular complications such as elevated blood pressure, heart attacks, cardiac  arrhythmias, and strokes. We discussed how MARK typically gets worse with age. We discussed treatment options for MARK, including the gold standard therapy (PAP), alternative options such as a mandibular advancement device (custom-made oral appliances) and surgeries. We will proceed CPAP therapy.     RECOMMENDATIONS  -Start Auto CPAP at pressures 5-12 cm H2O  -Discussed importance of adherence/compliance   -Prescription generated for supplies   -Patient counseled to avoid driving when sleepy. Encouraged to anticipate sleepiness, consider taking a 10 min nap prior to driving, alternate with another , or pull over if sleepy while driving  -Advised to contact our office or myself with any questions via MyChart    Positive airway pressure will favorably impact many of the adverse conditions and effects provoked by MARK.    Have advised the patient to follow up with the appropriate healthcare practitioners for all other medical problems and issues.    Return for 1-2 months after starting PAP therapy.      Please note portions of this record was created using voice recognition software. I have made every reasonable attempt to correct obvious errors, but I expect that there are errors of grammar and possibly content I did not discover before finalizing the note.

## 2023-04-24 ENCOUNTER — HOSPITAL ENCOUNTER (EMERGENCY)
Facility: MEDICAL CENTER | Age: 43
End: 2023-04-24
Attending: EMERGENCY MEDICINE
Payer: COMMERCIAL

## 2023-04-24 VITALS
SYSTOLIC BLOOD PRESSURE: 143 MMHG | WEIGHT: 232 LBS | HEIGHT: 72 IN | OXYGEN SATURATION: 99 % | TEMPERATURE: 96.7 F | BODY MASS INDEX: 31.42 KG/M2 | DIASTOLIC BLOOD PRESSURE: 81 MMHG | RESPIRATION RATE: 18 BRPM | HEART RATE: 63 BPM

## 2023-04-24 DIAGNOSIS — T15.02XA FOREIGN BODY OF LEFT CORNEA, INITIAL ENCOUNTER: ICD-10-CM

## 2023-04-24 PROCEDURE — 700101 HCHG RX REV CODE 250: Performed by: EMERGENCY MEDICINE

## 2023-04-24 PROCEDURE — 99283 EMERGENCY DEPT VISIT LOW MDM: CPT

## 2023-04-24 RX ORDER — PROPARACAINE HYDROCHLORIDE 5 MG/ML
1 SOLUTION/ DROPS OPHTHALMIC ONCE
Status: COMPLETED | OUTPATIENT
Start: 2023-04-24 | End: 2023-04-24

## 2023-04-24 RX ADMIN — PROPARACAINE HYDROCHLORIDE 1 DROP: 5 SOLUTION/ DROPS OPHTHALMIC at 08:25

## 2023-04-24 RX ADMIN — FLUORESCEIN SODIUM 1 MG: 1 STRIP OPHTHALMIC at 08:25

## 2023-04-24 ASSESSMENT — FIBROSIS 4 INDEX: FIB4 SCORE: 0.81

## 2023-04-24 NOTE — ED TRIAGE NOTES
"Chief Complaint   Patient presents with    Eye Pain     \"I feel like theres something in the back of my eye\" x1 days     Pt ambulatory to triage for above complaint. Pt denies trauma or vision changes. Eye appears irritated.     Pt is alert and oriented, speaking in full sentences, follows commands and responds appropriately to questions.      Pt placed in lobby. Pt educated on triage process and apologized for wait time. Pt encouraged to alert staff for any changes.     Patient and staff wearing appropriate PPE      BP (!) 149/78   Pulse 69   Temp 35.8 °C (96.5 °F) (Temporal)   Resp 18   Ht 1.829 m (6')   Wt 105 kg (232 lb)   SpO2 100%       "

## 2023-04-24 NOTE — ED PROVIDER NOTES
"ED Provider Note    CHIEF COMPLAINT  Chief Complaint   Patient presents with    Eye Pain     \"I feel like theres something in the back of my eye\" x1 days       EXTERNAL RECORDS REVIEWED  Sleep center evaluation.  Given CPAP    HPI/ROS      Soham Newman is a 43 y.o. male who presents to the emergency department with left eye discomfort.  He was working on a brass fitting of a hose.  He did not think that anything hit him in the eye but the day after he had discomfort and is progressively gotten worse over the last 2 days.    PAST MEDICAL HISTORY   has a past medical history of Cold, Diabetes (12/09), Diabetes (2010), Diabetes (HCC), Heart burn, and Seizure (HCC).    SURGICAL HISTORY   has a past surgical history that includes circumcision adult (1/12/2010) and other (2010).    FAMILY HISTORY  Family History   Problem Relation Age of Onset    Diabetes Paternal Grandfather     Diabetes Mother     Diabetes Father     Diabetes Brother     Diabetes Brother     Diabetes Maternal Grandmother        SOCIAL HISTORY  Social History     Tobacco Use    Smoking status: Every Day     Types: Cigars    Smokeless tobacco: Never    Tobacco comments:     2 daily   Vaping Use    Vaping Use: Never used   Substance and Sexual Activity    Alcohol use: Yes     Comment: rarely    Drug use: Yes     Comment: marajuana daily    Sexual activity: Yes     Partners: Female       CURRENT MEDICATIONS  Home Medications       Reviewed by Tamra Fowler R.N. (Registered Nurse) on 04/24/23 at 0750  Med List Status: Not Addressed     Medication Last Dose Status   Blood Glucose Monitoring Suppl (FREESTYLE LITE) Device  Active   Blood Glucose Test Strips  Active   Dulaglutide 1.5 MG/0.5ML Solution Pen-injector  Active   FREESTYLE LITE strip  Active   glucose monitoring kit (FREESTYLE) monitoring kit  Active   Lancets  Active   metFORMIN ER (GLUCOPHAGE XR) 500 MG TABLET SR 24 HR  Active   sildenafil citrate (VIAGRA) 50 MG tablet  Active         "            ALLERGIES  No Known Allergies    PHYSICAL EXAM  VITAL SIGNS: BP (!) 149/78   Pulse 69   Temp 35.8 °C (96.5 °F) (Temporal)   Resp 18   Ht 1.829 m (6')   Wt 105 kg (232 lb)   SpO2 100%   BMI 31.46 kg/m²    Pupils equal round reactive to light.  Injected conjunctive on the left.  There is a foreign body at about 12:00 of the left cornea.  Surrounding infiltrate.  No Cande sign.  No other corneal uptake.  There is direct photophobia no indirect photophobia.        COURSE & MEDICAL DECISION MAKING      INITIAL ASSESSMENT, COURSE AND PLAN  Care Narrative: Patient presents with eye pain.  He denied any known trauma, but he was working on a hose fitting prior to onset of symptoms.  He is identified to have a corneal foreign body with surrounding infiltrate.  I contacted ophthalmology.  I discussed case with Dr. Rashid.  He will see the patient in the office today for further evaluation.      DISPOSITION AND DISCUSSIONS  I have discussed management of the patient with the following physicians and RITU's: Dr. Larose, ophthalmology    Escalation of care considered, and ultimately not performed: Consider CT imaging, but no clinical evidence of corneal perforation or other foreign body    Decision tools and prescription drugs considered including, but not limited to: Sitter prescription antibiotic eyedrops, but patient will see ophthalmology today and they can be is prescribed per specialist description..    FINAL DIAGNOSIS  Corneal foreign body       Electronically signed by: Man Sousa M.D., 4/24/2023 8:36 AM

## 2023-09-26 ENCOUNTER — OFFICE VISIT (OUTPATIENT)
Dept: MEDICAL GROUP | Facility: PHYSICIAN GROUP | Age: 43
End: 2023-09-26
Payer: COMMERCIAL

## 2023-09-26 ENCOUNTER — APPOINTMENT (OUTPATIENT)
Dept: RADIOLOGY | Facility: IMAGING CENTER | Age: 43
End: 2023-09-26
Attending: STUDENT IN AN ORGANIZED HEALTH CARE EDUCATION/TRAINING PROGRAM
Payer: COMMERCIAL

## 2023-09-26 VITALS
HEIGHT: 73 IN | HEART RATE: 79 BPM | OXYGEN SATURATION: 98 % | DIASTOLIC BLOOD PRESSURE: 62 MMHG | TEMPERATURE: 97.2 F | SYSTOLIC BLOOD PRESSURE: 116 MMHG | RESPIRATION RATE: 20 BRPM | WEIGHT: 238 LBS | BODY MASS INDEX: 31.54 KG/M2

## 2023-09-26 DIAGNOSIS — Z23 NEED FOR VACCINATION: ICD-10-CM

## 2023-09-26 DIAGNOSIS — M53.3 COCCYX PAIN: ICD-10-CM

## 2023-09-26 DIAGNOSIS — E11.65 TYPE 2 DIABETES MELLITUS WITH HYPERGLYCEMIA, WITHOUT LONG-TERM CURRENT USE OF INSULIN (HCC): ICD-10-CM

## 2023-09-26 DIAGNOSIS — R09.A2 GLOBUS SENSATION: ICD-10-CM

## 2023-09-26 DIAGNOSIS — R49.9 CHANGE OF VOICE: ICD-10-CM

## 2023-09-26 DIAGNOSIS — L84 CALLUS OF FOOT: ICD-10-CM

## 2023-09-26 LAB
HBA1C MFR BLD: 7.6 % (ref ?–5.8)
POCT INT CON NEG: NEGATIVE
POCT INT CON POS: POSITIVE

## 2023-09-26 PROCEDURE — 92250 FUNDUS PHOTOGRAPHY W/I&R: CPT | Mod: TC | Performed by: STUDENT IN AN ORGANIZED HEALTH CARE EDUCATION/TRAINING PROGRAM

## 2023-09-26 PROCEDURE — 3078F DIAST BP <80 MM HG: CPT | Performed by: STUDENT IN AN ORGANIZED HEALTH CARE EDUCATION/TRAINING PROGRAM

## 2023-09-26 PROCEDURE — 99214 OFFICE O/P EST MOD 30 MIN: CPT | Mod: 25 | Performed by: STUDENT IN AN ORGANIZED HEALTH CARE EDUCATION/TRAINING PROGRAM

## 2023-09-26 PROCEDURE — 90686 IIV4 VACC NO PRSV 0.5 ML IM: CPT | Performed by: STUDENT IN AN ORGANIZED HEALTH CARE EDUCATION/TRAINING PROGRAM

## 2023-09-26 PROCEDURE — 90746 HEPB VACCINE 3 DOSE ADULT IM: CPT | Performed by: STUDENT IN AN ORGANIZED HEALTH CARE EDUCATION/TRAINING PROGRAM

## 2023-09-26 PROCEDURE — 3074F SYST BP LT 130 MM HG: CPT | Performed by: STUDENT IN AN ORGANIZED HEALTH CARE EDUCATION/TRAINING PROGRAM

## 2023-09-26 PROCEDURE — 90472 IMMUNIZATION ADMIN EACH ADD: CPT | Performed by: STUDENT IN AN ORGANIZED HEALTH CARE EDUCATION/TRAINING PROGRAM

## 2023-09-26 PROCEDURE — 72220 X-RAY EXAM SACRUM TAILBONE: CPT | Mod: TC | Performed by: RADIOLOGY

## 2023-09-26 PROCEDURE — 90471 IMMUNIZATION ADMIN: CPT | Performed by: STUDENT IN AN ORGANIZED HEALTH CARE EDUCATION/TRAINING PROGRAM

## 2023-09-26 PROCEDURE — 83036 HEMOGLOBIN GLYCOSYLATED A1C: CPT | Performed by: STUDENT IN AN ORGANIZED HEALTH CARE EDUCATION/TRAINING PROGRAM

## 2023-09-26 RX ORDER — DULAGLUTIDE 3 MG/.5ML
0.5 INJECTION, SOLUTION SUBCUTANEOUS
Qty: 6 ML | Refills: 3 | Status: SHIPPED | OUTPATIENT
Start: 2023-09-26

## 2023-09-26 RX ORDER — DULAGLUTIDE 3 MG/.5ML
0.5 INJECTION, SOLUTION SUBCUTANEOUS
Qty: 6 ML | Refills: 3 | Status: SHIPPED | OUTPATIENT
Start: 2023-09-26 | End: 2023-09-26

## 2023-09-26 ASSESSMENT — FIBROSIS 4 INDEX: FIB4 SCORE: 0.81

## 2023-09-26 NOTE — PROGRESS NOTES
"Subjective:     Chief Complaint   Patient presents with    Oral Swelling     Pt states that he has a ball in his throat;     Tailbone Pain     6x months     Medication Refill     Trulicity issues      HPI:   Soham presents today with globus sensation, medication refill and tailbone pain concerns.    Globus sensation  Noted about 3m ago. Notes more in the afternoon. Does resolve. Had some allergy issues a few weeks ago and started flonase, sudafed. No dysphagia.    Type 2 diabetes mellitus with hyperglycemia, without long-term current use of insulin (HCC)  This is a chronic condition.  Patient has been taking Trulicity 1.5 mg weekly.  He discontinued his metformin due to side effects prior.  Tolerating Trulicity well but does report for the past 3 months having a globus sensation and worried about side effects of this medication (thyroid cancer).  Denies any personal or family history of thyroid cancer or nodules.    Patient also reports tailbone pain for the past 6 months.  This occurs mostly after prolonged sitting.  Wonders if this is related to his weight loss as he previously was 400 pounds.  Denies any discharge, fever or chills.  No trauma.  Has not tried anything for this.    Review of Systems   Constitutional:  Negative for chills, fever, malaise/fatigue and weight loss.   HENT:  Negative for congestion and sore throat.    Respiratory:  Negative for cough and shortness of breath.    Cardiovascular:  Negative for chest pain, palpitations and leg swelling.   Gastrointestinal:  Negative for abdominal pain, blood in stool, constipation, diarrhea, nausea and vomiting.   Skin:  Negative for rash.   Neurological:  Negative for dizziness, tingling, sensory change and headaches.     Objective:     Exam:  /62 (BP Location: Left arm, Patient Position: Sitting, BP Cuff Size: Adult)   Pulse 79   Temp 36.2 °C (97.2 °F) (Temporal)   Resp 20   Ht 1.854 m (6' 1\")   Wt 108 kg (238 lb)   SpO2 98%   BMI 31.40 " kg/m²  Body mass index is 31.4 kg/m².    Physical Exam  Vitals reviewed.   Constitutional:       General: He is not in acute distress.     Appearance: Normal appearance. He is obese. He is not ill-appearing.   HENT:      Head: Normocephalic and atraumatic.      Right Ear: Tympanic membrane, ear canal and external ear normal.      Left Ear: Tympanic membrane, ear canal and external ear normal.      Nose: Nose normal. No rhinorrhea.      Mouth/Throat:      Mouth: Mucous membranes are moist.      Pharynx: No oropharyngeal exudate or posterior oropharyngeal erythema.   Eyes:      General: No scleral icterus.     Conjunctiva/sclera: Conjunctivae normal.   Neck:      Thyroid: No thyroid mass, thyromegaly or thyroid tenderness.   Cardiovascular:      Rate and Rhythm: Normal rate and regular rhythm.      Heart sounds: Normal heart sounds.   Pulmonary:      Effort: Pulmonary effort is normal. No respiratory distress.      Breath sounds: Normal breath sounds. No stridor. No wheezing, rhonchi or rales.   Musculoskeletal:      Cervical back: Normal range of motion and neck supple. No rigidity or tenderness.      Right lower leg: No edema.      Left lower leg: No edema.      Comments: Coccyx ttp without other findings. Standby: Reyes Zamora MS2.   Feet:      Comments: Monofilament testing with a 10 gram force: sensation intact: intact bilaterally  Visual Inspection: Feet without maceration, ulcers, fissures. Minimal callus medial great toes.  Pedal pulses: intact bilaterally    Lymphadenopathy:      Cervical: No cervical adenopathy.   Skin:     General: Skin is warm and dry.   Neurological:      General: No focal deficit present.      Mental Status: He is alert and oriented to person, place, and time.   Psychiatric:         Mood and Affect: Mood normal.         Behavior: Behavior normal.         Thought Content: Thought content normal.       Labs: Reviewed from 1/11/2022    Assessment & Plan:     43 y.o. male with the  following -     1. Type 2 diabetes mellitus with hyperglycemia, without long-term current use of insulin (HCC)  2. Callus of foot  Chronic, improved from last year but still uncontrolled at 7.6%.stressed importance of follow-up at least every 3 to 6 months depending on his A1c.  Increase Trulicity to 3 mg weekly as below.  Encouraged diabetic diet and exercise as tolerated.  Discussed diabetic foot care, declines referral to podiatry to manage mild callus.  - A1C in 3 months. Discussed ideal glucose ranges.  - Annual labs ordered.  - POCT  A1C  - Comp Metabolic Panel; Future  - Lipid Profile; Future  - CBC WITH DIFFERENTIAL; Future  - MICROALBUMIN CREAT RATIO URINE; Future  - POCT Retinal Eye Exam  - Dulaglutide (TRULICITY) 3 MG/0.5ML Solution Pen-injector; Inject 0.5 mL under the skin every 7 days.  Dispense: 6 mL; Refill: 3    3. Globus sensation  4. Change of voice  Ongoing for the past 3 months, intermittent.  Evaluate further with imaging as below.  Plan pending results.  - US-THYROID; Future  - TSH WITH REFLEX TO FT4; Future    5. Coccyx pain  This is a chronic condition, x-ray without findings.  Advised cushion/donut pillow.  - DX-SACRUM AND COCCYX 2+; Future    6. Need for vaccination  - Influenza Vaccine Quad Injection (PF)  - Hep B Adult 20+    Return in about 3 months (around 12/26/2023), or if symptoms worsen or fail to improve, for Diabetes, imaging follow up (next available).    Please note that this dictation was created using voice recognition software. I have made every reasonable attempt to correct obvious errors, but I expect that there are errors of grammar and possibly content that I did not discover before finalizing the note.

## 2023-09-26 NOTE — PATIENT INSTRUCTIONS
fasting labs due ASAP    I will be transferring to the clinic below October 2023.  [facility]  [address] [place] [address]

## 2023-09-26 NOTE — ASSESSMENT & PLAN NOTE
Noted about 3m ago. Notes more in the afternoon. Does resolve. Had some allergy issues a few weeks ago and started flonase, sudafed. No dysphagia.

## 2023-09-28 ASSESSMENT — ENCOUNTER SYMPTOMS
SORE THROAT: 0
FEVER: 0
VOMITING: 0
CHILLS: 0
DIZZINESS: 0
BLOOD IN STOOL: 0
DIARRHEA: 0
SHORTNESS OF BREATH: 0
SENSORY CHANGE: 0
ABDOMINAL PAIN: 0
PALPITATIONS: 0
NAUSEA: 0
CONSTIPATION: 0
HEADACHES: 0
WEIGHT LOSS: 0
TINGLING: 0
COUGH: 0

## 2023-09-28 NOTE — ASSESSMENT & PLAN NOTE
This is a chronic condition.  Patient has been taking Trulicity 1.5 mg weekly.  He discontinued his metformin due to side effects prior.  Tolerating Trulicity well but does report for the past 3 months having a globus sensation and worried about side effects of this medication (thyroid cancer).  Denies any personal or family history of thyroid cancer or nodules.

## 2023-10-11 LAB — RETINAL SCREEN: NEGATIVE

## 2023-10-23 ENCOUNTER — HOSPITAL ENCOUNTER (OUTPATIENT)
Dept: RADIOLOGY | Facility: MEDICAL CENTER | Age: 43
End: 2023-10-23
Attending: STUDENT IN AN ORGANIZED HEALTH CARE EDUCATION/TRAINING PROGRAM
Payer: COMMERCIAL

## 2023-10-23 DIAGNOSIS — R49.9 CHANGE OF VOICE: ICD-10-CM

## 2023-10-23 DIAGNOSIS — R09.A2 GLOBUS SENSATION: ICD-10-CM

## 2023-10-23 PROCEDURE — 76536 US EXAM OF HEAD AND NECK: CPT

## 2024-02-15 ENCOUNTER — OFFICE VISIT (OUTPATIENT)
Dept: MEDICAL GROUP | Facility: LAB | Age: 44
End: 2024-02-15
Payer: COMMERCIAL

## 2024-02-15 ENCOUNTER — PATIENT MESSAGE (OUTPATIENT)
Dept: MEDICAL GROUP | Facility: LAB | Age: 44
End: 2024-02-15

## 2024-02-15 VITALS
HEIGHT: 73 IN | WEIGHT: 223 LBS | HEART RATE: 74 BPM | DIASTOLIC BLOOD PRESSURE: 78 MMHG | BODY MASS INDEX: 29.55 KG/M2 | OXYGEN SATURATION: 98 % | RESPIRATION RATE: 20 BRPM | TEMPERATURE: 97.7 F | SYSTOLIC BLOOD PRESSURE: 128 MMHG

## 2024-02-15 DIAGNOSIS — E11.65 TYPE 2 DIABETES MELLITUS WITH HYPERGLYCEMIA, WITHOUT LONG-TERM CURRENT USE OF INSULIN (HCC): ICD-10-CM

## 2024-02-15 DIAGNOSIS — N52.9 ERECTILE DYSFUNCTION, UNSPECIFIED ERECTILE DYSFUNCTION TYPE: ICD-10-CM

## 2024-02-15 DIAGNOSIS — E11.9 TYPE 2 DIABETES MELLITUS WITHOUT COMPLICATION, WITHOUT LONG-TERM CURRENT USE OF INSULIN (HCC): ICD-10-CM

## 2024-02-15 DIAGNOSIS — G47.33 OSA (OBSTRUCTIVE SLEEP APNEA): ICD-10-CM

## 2024-02-15 PROBLEM — R09.A2 GLOBUS SENSATION: Status: RESOLVED | Noted: 2023-09-26 | Resolved: 2024-02-15

## 2024-02-15 LAB
HBA1C MFR BLD: 9.2 % (ref ?–5.8)
POCT INT CON NEG: NEGATIVE
POCT INT CON POS: POSITIVE

## 2024-02-15 PROCEDURE — 99214 OFFICE O/P EST MOD 30 MIN: CPT | Performed by: STUDENT IN AN ORGANIZED HEALTH CARE EDUCATION/TRAINING PROGRAM

## 2024-02-15 PROCEDURE — 83036 HEMOGLOBIN GLYCOSYLATED A1C: CPT | Performed by: STUDENT IN AN ORGANIZED HEALTH CARE EDUCATION/TRAINING PROGRAM

## 2024-02-15 PROCEDURE — 3078F DIAST BP <80 MM HG: CPT | Performed by: STUDENT IN AN ORGANIZED HEALTH CARE EDUCATION/TRAINING PROGRAM

## 2024-02-15 PROCEDURE — 3074F SYST BP LT 130 MM HG: CPT | Performed by: STUDENT IN AN ORGANIZED HEALTH CARE EDUCATION/TRAINING PROGRAM

## 2024-02-15 RX ORDER — ELECTROLYTES/DEXTROSE
SOLUTION, ORAL ORAL DAILY
COMMUNITY

## 2024-02-15 RX ORDER — SILDENAFIL 100 MG/1
100 TABLET, FILM COATED ORAL
Qty: 30 TABLET | Refills: 6 | Status: SHIPPED | OUTPATIENT
Start: 2024-02-15 | End: 2024-02-21 | Stop reason: SDUPTHER

## 2024-02-15 RX ORDER — SILDENAFIL 100 MG/1
100 TABLET, FILM COATED ORAL
Qty: 90 TABLET | Refills: 3 | Status: SHIPPED | OUTPATIENT
Start: 2024-02-15 | End: 2024-02-15

## 2024-02-15 ASSESSMENT — PATIENT HEALTH QUESTIONNAIRE - PHQ9: CLINICAL INTERPRETATION OF PHQ2 SCORE: 0

## 2024-02-15 ASSESSMENT — ENCOUNTER SYMPTOMS
WEIGHT LOSS: 1
NAUSEA: 0
DIARRHEA: 0
CHILLS: 0
FEVER: 0
SHORTNESS OF BREATH: 0
VOMITING: 0
COUGH: 0
ABDOMINAL PAIN: 0

## 2024-02-15 NOTE — ASSESSMENT & PLAN NOTE
Chronic.  Previously prescribed sildenafil 50 mg as needed. States this was not really effective, worked at 100mg prn.     Went to a clinic and had testosterone checked, normal per patient.

## 2024-02-15 NOTE — PATIENT INSTRUCTIONS
fasting labs due ASAP     Ideal glucose ranges: fasting , random (1-2 hours after eating) <180. If glucose <70, we need to adjust your medication to prevent continuing low blood sugar.    Check blood sugar twice a day for a week (send me the results)  -let me know if you need testing strips    64oz water daily    Sleep med appointment!

## 2024-02-15 NOTE — PROGRESS NOTES
"Subjective:     Chief Complaint   Patient presents with    Medication Refill     HPI:   Soham presents today for diabetes follow-up and medication refill of ED medicine.    Type 2 diabetes mellitus with hyperglycemia, without long-term current use of insulin (HCC)  This is a chronic condition.  Patient has been taking Trulicity 3mg weekly, restarted about a month ago. Was off x 3 months due to some unforeseen issues (arrested etc).     Patient states that he is back on track with his medication and denies any adverse side effects.  Has been working on diet and exercise for weight loss.    Aside from Trulicity which she currently takes he was on metformin prior which was discontinued due to side effects.    Erectile dysfunction  Chronic.  Previously prescribed sildenafil 50 mg as needed. States this was not really effective, worked at 100mg prn.     Went to a clinic and had testosterone checked, normal per patient.     Review of Systems   Constitutional:  Positive for weight loss (intentional). Negative for chills, fever and malaise/fatigue.   Respiratory:  Negative for cough and shortness of breath.    Cardiovascular:  Negative for chest pain.   Gastrointestinal:  Negative for abdominal pain, diarrhea, nausea and vomiting.   Genitourinary:  Negative for frequency.   Skin:  Negative for rash.     Objective:     Exam:  /78 (BP Location: Left arm, Patient Position: Sitting, BP Cuff Size: Adult)   Pulse 74   Temp 36.5 °C (97.7 °F) (Temporal)   Resp 20   Ht 1.854 m (6' 1\")   Wt 101 kg (223 lb)   SpO2 98%   BMI 29.42 kg/m²  Body mass index is 29.42 kg/m².    Physical Exam  Vitals reviewed.   Constitutional:       General: He is not in acute distress.     Appearance: Normal appearance. He is not ill-appearing.   HENT:      Head: Normocephalic and atraumatic.      Nose: Nose normal.      Mouth/Throat:      Mouth: Mucous membranes are moist.   Cardiovascular:      Rate and Rhythm: Normal rate and regular " rhythm.      Heart sounds: Normal heart sounds. No murmur heard.  Pulmonary:      Effort: Pulmonary effort is normal. No respiratory distress.      Breath sounds: Normal breath sounds. No wheezing, rhonchi or rales.   Musculoskeletal:      Cervical back: Normal range of motion and neck supple.   Neurological:      General: No focal deficit present.      Mental Status: He is alert and oriented to person, place, and time.   Psychiatric:         Mood and Affect: Mood normal.         Behavior: Behavior normal.         Thought Content: Thought content normal.       Labs: Reviewed from 1/11/2022  Imaging: Reviewed from 10/23/2023    Assessment & Plan:     44 y.o. male with the following -     1. Type 2 diabetes mellitus with hyperglycemia, without long-term current use of insulin (HCC)  Chronic, uncontrolled and worsened from prior due to lack of medication compliance.  He has restarted Trulicity 3 mg weekly approximately 1 month ago.  Advised patient to check blood sugars at home twice a day for the next week fasting and postprandial then send me a log.  If controlled can continue Trulicity 3 mg weekly, if poorly controlled will increase to 4.5 mg weekly.  Encouraged diabetic diet and exercise as tolerated.  - A1C in 3 months  - Annual labs reprinted to obtain ASAP.  - POCT  A1C    2. Erectile dysfunction, unspecified erectile dysfunction type  Chronic, controlled with below medication. Continue medication(s) as below.  - sildenafil citrate (VIAGRA) 100 MG tablet; Take 1 Tablet by mouth 1 time a day as needed for Erectile Dysfunction (max 100mg/day (1-4 hours prior to intercourse)).  Dispense: 90 Tablet; Refill: 3  ________________  Insurance won't cover #90, switched to #30 RF 6    3. MARK (obstructive sleep apnea)  Chronic, patient not able to tolerate CPAP but did not follow-up with sleep medicine to discuss.  Given his history of severe sleep apnea recommend the follow-up with sleep medicine to consider either  adjusting his CPAP settings or alternative treatment.    I spent a total of 34 minutes with record review, exam, communication with the patient, and documentation of this encounter.    Return in about 3 months (around 5/15/2024), or if symptoms worsen or fail to improve, for Diabetes.    Please note that this dictation was created using voice recognition software. I have made every reasonable attempt to correct obvious errors, but I expect that there are errors of grammar and possibly content that I did not discover before finalizing the note.

## 2024-02-15 NOTE — ASSESSMENT & PLAN NOTE
This is a chronic condition.  Patient has been taking Trulicity 3mg weekly, restarted about a month ago. Was off x 3 months due to some unforeseen issues (arrested etc).     Patient states that he is back on track with his medication and denies any adverse side effects.  Has been working on diet and exercise for weight loss.    Aside from Trulicity which she currently takes he was on metformin prior which was discontinued due to side effects.

## 2024-02-16 NOTE — PATIENT COMMUNICATION
Received request via: Patient    Was the patient seen in the last year in this department? Yes    Does the patient have an active prescription (recently filled or refills available) for medication(s) requested? No    Pharmacy Name: [address]     Does the patient have residential Plus and need 100 day supply (blood pressure, diabetes and cholesterol meds only)? Patient does not have SCP

## 2024-02-20 ENCOUNTER — TELEPHONE (OUTPATIENT)
Dept: MEDICAL GROUP | Facility: LAB | Age: 44
End: 2024-02-20
Payer: COMMERCIAL

## 2024-02-20 DIAGNOSIS — N52.9 ERECTILE DYSFUNCTION, UNSPECIFIED ERECTILE DYSFUNCTION TYPE: ICD-10-CM

## 2024-02-20 NOTE — TELEPHONE ENCOUNTER
"Got a right fax from [employer] stating that \"has a quantity limit of 8 tablets in 30 days.  \"I know that I spoke to regarding this matter.  You just wanted me to forward you an encounter  "

## 2024-02-21 RX ORDER — SILDENAFIL 100 MG/1
100 TABLET, FILM COATED ORAL
Qty: 8 TABLET | Refills: 11 | Status: SHIPPED | OUTPATIENT
Start: 2024-02-21

## 2024-05-15 ENCOUNTER — OFFICE VISIT (OUTPATIENT)
Dept: MEDICAL GROUP | Facility: LAB | Age: 44
End: 2024-05-15
Payer: COMMERCIAL

## 2024-05-15 VITALS
WEIGHT: 230 LBS | SYSTOLIC BLOOD PRESSURE: 102 MMHG | HEART RATE: 77 BPM | HEIGHT: 72 IN | TEMPERATURE: 97.1 F | OXYGEN SATURATION: 98 % | DIASTOLIC BLOOD PRESSURE: 64 MMHG | BODY MASS INDEX: 31.15 KG/M2 | RESPIRATION RATE: 20 BRPM

## 2024-05-15 DIAGNOSIS — M25.649 THUMB JOINT STIFFNESS: ICD-10-CM

## 2024-05-15 DIAGNOSIS — E11.65 TYPE 2 DIABETES MELLITUS WITH HYPERGLYCEMIA, WITHOUT LONG-TERM CURRENT USE OF INSULIN (HCC): ICD-10-CM

## 2024-05-15 DIAGNOSIS — M65.311 TRIGGER FINGER OF RIGHT THUMB: ICD-10-CM

## 2024-05-15 DIAGNOSIS — G47.33 OSA (OBSTRUCTIVE SLEEP APNEA): ICD-10-CM

## 2024-05-15 PROBLEM — R49.9 CHANGE OF VOICE: Status: RESOLVED | Noted: 2023-09-26 | Resolved: 2024-05-15

## 2024-05-15 LAB
HBA1C MFR BLD: 7.3 % (ref ?–5.8)
POCT INT CON NEG: NEGATIVE
POCT INT CON POS: POSITIVE

## 2024-05-15 PROCEDURE — 83036 HEMOGLOBIN GLYCOSYLATED A1C: CPT | Performed by: STUDENT IN AN ORGANIZED HEALTH CARE EDUCATION/TRAINING PROGRAM

## 2024-05-15 PROCEDURE — 3078F DIAST BP <80 MM HG: CPT | Performed by: STUDENT IN AN ORGANIZED HEALTH CARE EDUCATION/TRAINING PROGRAM

## 2024-05-15 PROCEDURE — 3074F SYST BP LT 130 MM HG: CPT | Performed by: STUDENT IN AN ORGANIZED HEALTH CARE EDUCATION/TRAINING PROGRAM

## 2024-05-15 PROCEDURE — 99214 OFFICE O/P EST MOD 30 MIN: CPT | Performed by: STUDENT IN AN ORGANIZED HEALTH CARE EDUCATION/TRAINING PROGRAM

## 2024-05-15 RX ORDER — DULAGLUTIDE 4.5 MG/.5ML
0.5 INJECTION, SOLUTION SUBCUTANEOUS
Qty: 6 ML | Refills: 3 | Status: SHIPPED | OUTPATIENT
Start: 2024-05-15

## 2024-05-15 ASSESSMENT — ENCOUNTER SYMPTOMS
FEVER: 0
WEIGHT LOSS: 0
VOMITING: 0
CHILLS: 0
SHORTNESS OF BREATH: 0
CONSTIPATION: 1
NAUSEA: 0
ABDOMINAL PAIN: 0
DIARRHEA: 0
BLOOD IN STOOL: 0
COUGH: 0

## 2024-05-15 NOTE — PROGRESS NOTES
Subjective:     Chief Complaint   Patient presents with    Follow-Up    Diabetes Follow-up     HPI:   Soham is a 44 y.o. male who presents today with    Type 2 diabetes mellitus with hyperglycemia, without long-term current use of insulin (HCC)  Chronic. Taking Trulicity 3mg weekly. No SE aside from maybe constipation. Plans to try some metamucil.     Glucose (not checking for the past week)  Fasting 103-130s, max 145  No hypoglycemia.     Admits that that he is bad about getting his labs done.  He has not made an appointment with sleep medicine, not using CPAP.    Reports chronic right, joint stiffness that is seems typically worse in the morning.  Sometimes he states that the thumb will get stuck necessitating him to straighten it out with the other hand.  Denies any injury.  Right-hand-dominant.  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    Review of Systems   Constitutional:  Negative for chills, fever, malaise/fatigue and weight loss.   Respiratory:  Negative for cough and shortness of breath.    Cardiovascular:  Negative for chest pain.   Gastrointestinal:  Positive for constipation. Negative for abdominal pain, blood in stool, diarrhea, nausea and vomiting.   Musculoskeletal:  Positive for joint pain.   Skin:  Negative for rash.     Objective:     Exam:  /64 (BP Location: Right arm, Patient Position: Sitting, BP Cuff Size: Adult)   Pulse 77   Temp 36.2 °C (97.1 °F) (Temporal)   Resp 20   Ht 1.829 m (6')   Wt 104 kg (230 lb)   SpO2 98%   BMI 31.19 kg/m²  Body mass index is 31.19 kg/m².    Physical Exam  Vitals reviewed.   Constitutional:       General: He is not in acute distress.     Appearance: Normal appearance. He is not ill-appearing.   HENT:      Head: Normocephalic and atraumatic.      Nose: Nose normal.      Mouth/Throat:      Mouth: Mucous membranes are moist.   Cardiovascular:      Rate and Rhythm: Normal rate and regular rhythm.      Heart sounds: Normal heart sounds. No murmur  heard.  Pulmonary:      Effort: Pulmonary effort is normal. No respiratory distress.      Breath sounds: Normal breath sounds. No wheezing, rhonchi or rales.   Musculoskeletal:      Right hand: No swelling, tenderness or bony tenderness. Decreased range of motion (thumb extension).      Left hand: Normal range of motion.      Cervical back: Normal range of motion and neck supple.      Right lower leg: No edema.      Left lower leg: No edema.   Skin:     General: Skin is warm and dry.   Neurological:      General: No focal deficit present.      Mental Status: He is alert and oriented to person, place, and time.   Psychiatric:         Mood and Affect: Mood normal.         Behavior: Behavior normal.         Thought Content: Thought content normal.       Labs: Reviewed from 1/11/2022  Imaging: Reviewed from 10/23/2023    Assessment & Plan:     44 y.o. male with the following -     1. Type 2 diabetes mellitus with hyperglycemia, without long-term current use of insulin (HCC)  Chronic, improving.  Discussed treatment options.  He agrees to increasing Trulicity to 4.5 mg weekly.  Encouraged diabetic diet and exercise as tolerated.  - A1C in 3 months. Discussed ideal glucose ranges.  - Annual labs due, reprinted for him to get ASAP  - POCT Hemoglobin A1C  - Dulaglutide (TRULICITY) 4.5 MG/0.5ML Solution Pen-injector; Inject 0.5 mL under the skin every 7 days.  Dispense: 6 mL; Refill: 3  - MICROALBUMIN CREAT RATIO URINE; Future    2. Trigger finger of right thumb  3. Thumb joint stiffness  Chronic, discussed ways to treat.  May benefit from hand therapy and nocturnal splinting-referred.  Will have patient evaluated by hand surgery as well.  - Referral to Hand Surgery  - Referral to Hand Therapy    4. MARK (obstructive sleep apnea)  Chronic, patient not able to tolerate CPAP prior but did not follow-up with sleep medicine to discuss. Given his history of severe sleep apnea recommend the follow-up with sleep medicine to consider  either adjusting his CPAP settings or alternative treatment. New referral placed.  - Referral to Pulmonary and Sleep Medicine    Return in about 3 months (around 8/15/2024), or if symptoms worsen or fail to improve, for Diabetes.    Please note that this dictation was created using voice recognition software. I have made every reasonable attempt to correct obvious errors, but I expect that there are errors of grammar and possibly content that I did not discover before finalizing the note.

## 2024-05-15 NOTE — PATIENT INSTRUCTIONS
fasting labs due ASAP     Ideal glucose ranges: fasting , random (1-2 hours after eating) <180. If glucose <70, we need to adjust your medication to prevent continuing low blood sugar.

## 2024-05-15 NOTE — ASSESSMENT & PLAN NOTE
Chronic. Taking Trulicity 3mg weekly. No SE aside from maybe constipation. Plans to try some metamucil.     Glucose (not checking for the past week)  Fasting 103-130s, max 145  No hypoglycemia.

## 2024-05-16 ENCOUNTER — HOSPITAL ENCOUNTER (OUTPATIENT)
Dept: LAB | Facility: MEDICAL CENTER | Age: 44
End: 2024-05-16
Attending: STUDENT IN AN ORGANIZED HEALTH CARE EDUCATION/TRAINING PROGRAM
Payer: COMMERCIAL

## 2024-05-16 DIAGNOSIS — R49.9 CHANGE OF VOICE: ICD-10-CM

## 2024-05-16 DIAGNOSIS — E11.65 TYPE 2 DIABETES MELLITUS WITH HYPERGLYCEMIA, WITHOUT LONG-TERM CURRENT USE OF INSULIN (HCC): ICD-10-CM

## 2024-05-16 DIAGNOSIS — R09.A2 GLOBUS SENSATION: ICD-10-CM

## 2024-05-16 LAB
ALBUMIN SERPL BCP-MCNC: 4.1 G/DL (ref 3.2–4.9)
ALBUMIN/GLOB SERPL: 1.5 G/DL
ALP SERPL-CCNC: 72 U/L (ref 30–99)
ALT SERPL-CCNC: 17 U/L (ref 2–50)
ANION GAP SERPL CALC-SCNC: 9 MMOL/L (ref 7–16)
AST SERPL-CCNC: 24 U/L (ref 12–45)
BASOPHILS # BLD AUTO: 0.9 % (ref 0–1.8)
BASOPHILS # BLD: 0.05 K/UL (ref 0–0.12)
BILIRUB SERPL-MCNC: 0.8 MG/DL (ref 0.1–1.5)
BUN SERPL-MCNC: 19 MG/DL (ref 8–22)
CALCIUM ALBUM COR SERPL-MCNC: 8.8 MG/DL (ref 8.5–10.5)
CALCIUM SERPL-MCNC: 8.9 MG/DL (ref 8.5–10.5)
CHLORIDE SERPL-SCNC: 103 MMOL/L (ref 96–112)
CHOLEST SERPL-MCNC: 138 MG/DL (ref 100–199)
CO2 SERPL-SCNC: 24 MMOL/L (ref 20–33)
CREAT SERPL-MCNC: 0.72 MG/DL (ref 0.5–1.4)
EOSINOPHIL # BLD AUTO: 0.19 K/UL (ref 0–0.51)
EOSINOPHIL NFR BLD: 3.4 % (ref 0–6.9)
ERYTHROCYTE [DISTWIDTH] IN BLOOD BY AUTOMATED COUNT: 42.9 FL (ref 35.9–50)
FASTING STATUS PATIENT QL REPORTED: NORMAL
GFR SERPLBLD CREATININE-BSD FMLA CKD-EPI: 117 ML/MIN/1.73 M 2
GLOBULIN SER CALC-MCNC: 2.7 G/DL (ref 1.9–3.5)
GLUCOSE SERPL-MCNC: 156 MG/DL (ref 65–99)
HCT VFR BLD AUTO: 44.1 % (ref 42–52)
HDLC SERPL-MCNC: 54 MG/DL
HGB BLD-MCNC: 15.6 G/DL (ref 14–18)
IMM GRANULOCYTES # BLD AUTO: 0.02 K/UL (ref 0–0.11)
IMM GRANULOCYTES NFR BLD AUTO: 0.4 % (ref 0–0.9)
LDLC SERPL CALC-MCNC: 68 MG/DL
LYMPHOCYTES # BLD AUTO: 2.25 K/UL (ref 1–4.8)
LYMPHOCYTES NFR BLD: 40.7 % (ref 22–41)
MCH RBC QN AUTO: 33.2 PG (ref 27–33)
MCHC RBC AUTO-ENTMCNC: 35.4 G/DL (ref 32.3–36.5)
MCV RBC AUTO: 93.8 FL (ref 81.4–97.8)
MONOCYTES # BLD AUTO: 0.48 K/UL (ref 0–0.85)
MONOCYTES NFR BLD AUTO: 8.7 % (ref 0–13.4)
NEUTROPHILS # BLD AUTO: 2.54 K/UL (ref 1.82–7.42)
NEUTROPHILS NFR BLD: 45.9 % (ref 44–72)
NRBC # BLD AUTO: 0 K/UL
NRBC BLD-RTO: 0 /100 WBC (ref 0–0.2)
PLATELET # BLD AUTO: 173 K/UL (ref 164–446)
PMV BLD AUTO: 10.2 FL (ref 9–12.9)
POTASSIUM SERPL-SCNC: 4 MMOL/L (ref 3.6–5.5)
PROT SERPL-MCNC: 6.8 G/DL (ref 6–8.2)
RBC # BLD AUTO: 4.7 M/UL (ref 4.7–6.1)
SODIUM SERPL-SCNC: 136 MMOL/L (ref 135–145)
TRIGL SERPL-MCNC: 82 MG/DL (ref 0–149)
TSH SERPL DL<=0.005 MIU/L-ACNC: 0.61 UIU/ML (ref 0.38–5.33)
WBC # BLD AUTO: 5.5 K/UL (ref 4.8–10.8)

## 2024-05-17 LAB
CREAT UR-MCNC: 105.86 MG/DL
MICROALBUMIN UR-MCNC: <1.2 MG/DL
MICROALBUMIN/CREAT UR: NORMAL MG/G (ref 0–30)

## 2024-10-13 ENCOUNTER — OFFICE VISIT (OUTPATIENT)
Dept: URGENT CARE | Facility: PHYSICIAN GROUP | Age: 44
End: 2024-10-13
Payer: COMMERCIAL

## 2024-10-13 VITALS
WEIGHT: 241 LBS | BODY MASS INDEX: 32.64 KG/M2 | SYSTOLIC BLOOD PRESSURE: 118 MMHG | DIASTOLIC BLOOD PRESSURE: 72 MMHG | TEMPERATURE: 98.1 F | OXYGEN SATURATION: 99 % | HEIGHT: 72 IN | RESPIRATION RATE: 14 BRPM | HEART RATE: 85 BPM

## 2024-10-13 DIAGNOSIS — L08.9 RIGHT KNEE SKIN INFECTION: ICD-10-CM

## 2024-10-13 PROCEDURE — 3074F SYST BP LT 130 MM HG: CPT | Performed by: PHYSICIAN ASSISTANT

## 2024-10-13 PROCEDURE — 99203 OFFICE O/P NEW LOW 30 MIN: CPT | Performed by: PHYSICIAN ASSISTANT

## 2024-10-13 PROCEDURE — 3078F DIAST BP <80 MM HG: CPT | Performed by: PHYSICIAN ASSISTANT

## 2024-10-13 RX ORDER — MUPIROCIN 20 MG/G
1 OINTMENT TOPICAL 2 TIMES DAILY
Qty: 30 G | Refills: 0 | Status: SHIPPED | OUTPATIENT
Start: 2024-10-13

## 2024-10-13 RX ORDER — CEPHALEXIN 500 MG/1
500 CAPSULE ORAL 4 TIMES DAILY
Qty: 28 CAPSULE | Refills: 0 | Status: SHIPPED | OUTPATIENT
Start: 2024-10-13

## 2024-10-13 ASSESSMENT — FIBROSIS 4 INDEX: FIB4 SCORE: 1.41

## 2024-10-14 ENCOUNTER — APPOINTMENT (OUTPATIENT)
Dept: MEDICAL GROUP | Facility: PHYSICIAN GROUP | Age: 44
End: 2024-10-14
Payer: COMMERCIAL

## 2024-10-29 ASSESSMENT — ENCOUNTER SYMPTOMS
ROS SKIN COMMENTS: WOUND INFECTION
CONSTITUTIONAL NEGATIVE: 1
NEUROLOGICAL NEGATIVE: 1
RESPIRATORY NEGATIVE: 1
MUSCULOSKELETAL NEGATIVE: 1
CARDIOVASCULAR NEGATIVE: 1

## 2025-01-02 ENCOUNTER — OFFICE VISIT (OUTPATIENT)
Dept: URGENT CARE | Facility: PHYSICIAN GROUP | Age: 45
End: 2025-01-02
Payer: COMMERCIAL

## 2025-01-02 VITALS
HEART RATE: 84 BPM | WEIGHT: 244 LBS | BODY MASS INDEX: 33.05 KG/M2 | DIASTOLIC BLOOD PRESSURE: 98 MMHG | TEMPERATURE: 97.4 F | RESPIRATION RATE: 14 BRPM | HEIGHT: 72 IN | OXYGEN SATURATION: 98 % | SYSTOLIC BLOOD PRESSURE: 130 MMHG

## 2025-01-02 DIAGNOSIS — R68.89 FLU-LIKE SYMPTOMS: ICD-10-CM

## 2025-01-02 DIAGNOSIS — J06.9 URI WITH COUGH AND CONGESTION: ICD-10-CM

## 2025-01-02 DIAGNOSIS — J34.89 RHINORRHEA: ICD-10-CM

## 2025-01-02 LAB
FLUAV RNA SPEC QL NAA+PROBE: NEGATIVE
FLUBV RNA SPEC QL NAA+PROBE: NEGATIVE
RSV RNA SPEC QL NAA+PROBE: NEGATIVE
SARS-COV-2 RNA RESP QL NAA+PROBE: NEGATIVE

## 2025-01-02 PROCEDURE — 3080F DIAST BP >= 90 MM HG: CPT | Performed by: NURSE PRACTITIONER

## 2025-01-02 PROCEDURE — 99213 OFFICE O/P EST LOW 20 MIN: CPT | Performed by: NURSE PRACTITIONER

## 2025-01-02 PROCEDURE — 3075F SYST BP GE 130 - 139MM HG: CPT | Performed by: NURSE PRACTITIONER

## 2025-01-02 PROCEDURE — 0241U POCT CEPHEID COV-2, FLU A/B, RSV - PCR: CPT | Performed by: NURSE PRACTITIONER

## 2025-01-02 RX ORDER — PREDNISONE 20 MG/1
40 TABLET ORAL DAILY
Qty: 10 TABLET | Refills: 0 | Status: SHIPPED | OUTPATIENT
Start: 2025-01-02 | End: 2025-01-07

## 2025-01-02 RX ORDER — DULAGLUTIDE 4.5 MG/.5ML
INJECTION, SOLUTION SUBCUTANEOUS
COMMUNITY
Start: 2024-12-19

## 2025-01-02 RX ORDER — FLUTICASONE PROPIONATE 50 MCG
1 SPRAY, SUSPENSION (ML) NASAL DAILY
Qty: 16 G | Refills: 0 | Status: SHIPPED | OUTPATIENT
Start: 2025-01-02 | End: 2025-01-12

## 2025-01-02 ASSESSMENT — ENCOUNTER SYMPTOMS
RHINORRHEA: 1
VOMITING: 0
ABDOMINAL PAIN: 0
COUGH: 1
FEVER: 0
SPUTUM PRODUCTION: 0
SORE THROAT: 0
MYALGIAS: 1
HEMOPTYSIS: 0
WHEEZING: 0
SHORTNESS OF BREATH: 0
NAUSEA: 0
HEADACHES: 1
DIARRHEA: 0

## 2025-01-02 ASSESSMENT — FIBROSIS 4 INDEX: FIB4 SCORE: 1.41

## 2025-01-02 NOTE — PROGRESS NOTES
Subjective:     Soham Newman is a 44 y.o. male who presents for Cough (X2days ), Congestion, and Pharyngitis      Cough  Chronicity: Soham is a pleasant 44 year old male who presentsl to  today with complaints of flu like symptoms X 2 days. Associated symptoms include headaches, myalgias, nasal congestion and rhinorrhea. Pertinent negatives include no fever, hemoptysis, sore throat, shortness of breath or wheezing. Treatments tried: Mucinex and charlie seltzer. The treatment provided no relief.   Pharyngitis   Associated symptoms include congestion, coughing and headaches. Pertinent negatives include no abdominal pain, diarrhea, shortness of breath or vomiting.         Review of Systems   Constitutional:  Positive for malaise/fatigue. Negative for fever.   HENT:  Positive for congestion and rhinorrhea. Negative for sore throat.    Respiratory:  Positive for cough. Negative for hemoptysis, sputum production, shortness of breath and wheezing.    Gastrointestinal:  Negative for abdominal pain, diarrhea, nausea and vomiting.   Musculoskeletal:  Positive for myalgias.   Neurological:  Positive for headaches.       PMH:   Past Medical History:   Diagnosis Date    Change of voice 09/26/2023    Cold     Diabetes 12/01/2009    Diabetes 01/01/2010    Dr. Irizarry- type 2    Diabetes (HCC)     Globus sensation     Heart burn     Seizure (HCC)     as an infant     ALLERGIES: No Known Allergies  SURGHX:   Past Surgical History:   Procedure Laterality Date    CIRCUMCISION ADULT  1/12/2010    Performed by RYAN KRUEGER at SURGERY [facility] ORS    OTHER  2010    adult circumcision     SOCHX:   Social History     Socioeconomic History    Marital status:    Occupational History    Occupation:      Employer: [employer]   Tobacco Use    Smoking status: Former     Types: Cigars    Smokeless tobacco: Never    Tobacco comments:     2 daily   Vaping Use    Vaping status: Every Day    Substances: Nicotine,  Flavoring    Devices: Disposable, Pre-filled pod   Substance and Sexual Activity    Alcohol use: Yes     Comment: rarely    Drug use: Not Currently    Sexual activity: Yes     Partners: Female   Social History Narrative    ** Merged History Encounter **         ** Merged History Encounter **         , 2 sons     FH:   Family History   Problem Relation Age of Onset    Diabetes Paternal Grandfather     Diabetes Mother     Diabetes Father     Diabetes Brother     Diabetes Brother     Diabetes Maternal Grandmother          Objective:   BP (!) 130/98   Pulse 84   Temp 36.3 °C (97.4 °F) (Temporal)   Resp 14   Ht 1.829 m (6')   Wt 111 kg (244 lb)   SpO2 98%   BMI 33.09 kg/m²     Physical Exam  Vitals and nursing note reviewed.   Constitutional:       General: He is not in acute distress.     Appearance: Normal appearance. He is normal weight. He is ill-appearing. He is not toxic-appearing.   HENT:      Head: Normocephalic.      Right Ear: Tympanic membrane, ear canal and external ear normal.      Left Ear: Tympanic membrane, ear canal and external ear normal.      Nose: Congestion and rhinorrhea present.      Mouth/Throat:      Mouth: Mucous membranes are moist.      Pharynx: No oropharyngeal exudate or posterior oropharyngeal erythema.   Eyes:      General:         Right eye: No discharge.         Left eye: No discharge.      Extraocular Movements: Extraocular movements intact.      Conjunctiva/sclera: Conjunctivae normal.      Pupils: Pupils are equal, round, and reactive to light.   Cardiovascular:      Rate and Rhythm: Normal rate and regular rhythm.      Pulses: Normal pulses.      Heart sounds: Normal heart sounds.   Pulmonary:      Effort: Pulmonary effort is normal. No respiratory distress.      Breath sounds: Normal breath sounds. No stridor. No wheezing, rhonchi or rales.   Chest:      Chest wall: No tenderness.   Abdominal:      General: Abdomen is flat.   Musculoskeletal:         General: Normal  range of motion.      Cervical back: Normal range of motion and neck supple. No rigidity or tenderness.   Lymphadenopathy:      Cervical: No cervical adenopathy.   Skin:     General: Skin is warm and dry.   Neurological:      General: No focal deficit present.      Mental Status: He is alert and oriented to person, place, and time. Mental status is at baseline.   Psychiatric:         Mood and Affect: Mood normal.         Behavior: Behavior normal.         Judgment: Judgment normal.       Results for orders placed or performed in visit on 01/02/25   POCT Cepheid CoV-2, Flu A/B, RSV - PCR    Collection Time: 01/02/25  9:36 AM   Result Value Ref Range    SARS-CoV-2 by PCR Negative Negative, Invalid    Influenza virus A RNA Negative Negative, Invalid    Influenza virus B, PCR Negative Negative, Invalid    RSV, PCR Negative Negative, Invalid       Assessment/Plan:   Assessment      1. Flu-like symptoms  POCT Cepheid CoV-2, Flu A/B, RSV - PCR      2. Rhinorrhea  fluticasone (FLONASE) 50 MCG/ACT nasal spray      3. URI with cough and congestion  predniSONE (DELTASONE) 20 MG Tab        Prescriptions called into pharmacy. AVS printed and reviewed with pt. Red flags identified of when to seek care back in UC or ER including SOB, increased fever and worsening symptoms. Symptomatic treatment such as OTC Ibuprofen/ Acetaminophen, increased fluids, and humidifier encouraged Pt in agreement with care plan today.

## 2025-03-18 ENCOUNTER — TELEPHONE (OUTPATIENT)
Dept: MEDICAL GROUP | Facility: LAB | Age: 45
End: 2025-03-18

## 2025-03-18 ENCOUNTER — OFFICE VISIT (OUTPATIENT)
Dept: MEDICAL GROUP | Facility: LAB | Age: 45
End: 2025-03-18
Payer: COMMERCIAL

## 2025-03-18 VITALS
BODY MASS INDEX: 32.4 KG/M2 | OXYGEN SATURATION: 99 % | WEIGHT: 239.2 LBS | RESPIRATION RATE: 14 BRPM | HEIGHT: 72 IN | TEMPERATURE: 97.8 F | DIASTOLIC BLOOD PRESSURE: 78 MMHG | SYSTOLIC BLOOD PRESSURE: 124 MMHG | HEART RATE: 89 BPM

## 2025-03-18 DIAGNOSIS — R68.82 LOW LIBIDO: ICD-10-CM

## 2025-03-18 DIAGNOSIS — G47.33 OSA (OBSTRUCTIVE SLEEP APNEA): ICD-10-CM

## 2025-03-18 DIAGNOSIS — E11.69 TYPE 2 DIABETES MELLITUS WITH OTHER SPECIFIED COMPLICATION, WITHOUT LONG-TERM CURRENT USE OF INSULIN (HCC): ICD-10-CM

## 2025-03-18 LAB
HBA1C MFR BLD: 7.7 % (ref ?–5.8)
POCT INT CON NEG: NEGATIVE
POCT INT CON POS: POSITIVE

## 2025-03-18 PROCEDURE — 3078F DIAST BP <80 MM HG: CPT | Performed by: STUDENT IN AN ORGANIZED HEALTH CARE EDUCATION/TRAINING PROGRAM

## 2025-03-18 PROCEDURE — 3074F SYST BP LT 130 MM HG: CPT | Performed by: STUDENT IN AN ORGANIZED HEALTH CARE EDUCATION/TRAINING PROGRAM

## 2025-03-18 PROCEDURE — 99214 OFFICE O/P EST MOD 30 MIN: CPT | Performed by: STUDENT IN AN ORGANIZED HEALTH CARE EDUCATION/TRAINING PROGRAM

## 2025-03-18 PROCEDURE — 83036 HEMOGLOBIN GLYCOSYLATED A1C: CPT | Performed by: STUDENT IN AN ORGANIZED HEALTH CARE EDUCATION/TRAINING PROGRAM

## 2025-03-18 ASSESSMENT — ENCOUNTER SYMPTOMS
SHORTNESS OF BREATH: 0
COUGH: 0
ABDOMINAL PAIN: 0
WEIGHT LOSS: 0
VOMITING: 0
FEVER: 0
CHILLS: 0
NAUSEA: 0
DIARRHEA: 0

## 2025-03-18 ASSESSMENT — FIBROSIS 4 INDEX: FIB4 SCORE: 1.45

## 2025-03-18 ASSESSMENT — PATIENT HEALTH QUESTIONNAIRE - PHQ9: CLINICAL INTERPRETATION OF PHQ2 SCORE: 0

## 2025-03-18 NOTE — PROGRESS NOTES
Verbal consent was acquired by the patient to use [employer] ambient listening note generation during this visit Yes.    Subjective:     Chief Complaint   Patient presents with    Diabetes Follow-up       HPI:     History of Present Illness  The patient is a 45-year-old male who presents for a diabetes follow-up.    He has expressed interest in initiating testosterone therapy due to persistent fatigue, even with regular gym attendance. He reports morning tiredness and a new habit of napping for several hours after work. He does not use his CPAP machine due to discomfort and difficulty operating it, despite a previous month-long trial. During this trial, he experienced episodes of waking up gasping for air. His last consultation with a sleep specialist was some time ago. He reports low libido and continues to experience erectile dysfunction.    He has been adhering to his Trulicity regimen, although with occasional delays in administration by a few days at times. He reports no gastrointestinal upset associated with the medication. His diet typically includes a chicken wrap and sugar-free lemonade for lunch, and a low-carbohydrate dinner. He admits to consuming Oreos occasionally and uses peanut butter with carrots as a substitute when craving sweets. He recalls receiving dietary advice from a dietitian at the onset of his diabetes diagnosis, which emphasized the avoidance of high-carbohydrate foods. He has not undergone an eye examination within the past year.    Review of Systems   Constitutional:  Negative for chills, fever, malaise/fatigue and weight loss.   Respiratory:  Negative for cough and shortness of breath.    Cardiovascular:  Negative for chest pain.   Gastrointestinal:  Negative for abdominal pain, diarrhea, nausea and vomiting.   Skin:  Negative for rash.     Objective:     Exam:  /78 (BP Location: Left arm, Patient Position: Sitting, BP Cuff Size: Adult long)   Pulse 89   Temp 36.6 °C (97.8  °F) (Temporal)   Resp 14   Ht 1.829 m (6')   Wt 109 kg (239 lb 3.2 oz)   SpO2 99%   BMI 32.44 kg/m²  Body mass index is 32.44 kg/m².    Physical Exam  Vitals reviewed.   Constitutional:       Appearance: Normal appearance.   HENT:      Head: Normocephalic and atraumatic.      Mouth/Throat:      Mouth: Mucous membranes are moist.   Pulmonary:      Effort: Pulmonary effort is normal.   Musculoskeletal:      Right lower leg: No edema.      Left lower leg: No edema.   Feet:      Comments: Monofilament testing with a 10 gram force: sensation intact: intact bilaterally  Visual Inspection: Feet without maceration, ulcers, fissures.  Pedal pulses: intact bilaterally  Skin:     General: Skin is warm and dry.   Neurological:      General: No focal deficit present.      Mental Status: He is alert and oriented to person, place, and time.   Psychiatric:         Mood and Affect: Mood normal.         Behavior: Behavior normal.         Thought Content: Thought content normal.       Labs: Reviewed from 5/16/2024    Assessment & Plan:     45 y.o. male with the following -     1. Type 2 diabetes mellitus with other specified complication, without long-term current use of insulin (HCC)  Chronic, uncontrolled. Continue trulicity 4.5mg weekly. Had SE prior to metformin so declines. Discussed options, will see if jardiance costly-messaged staff to start PA. Discussed other options if not covered. Encouraged diabetic diet and exercise as tolerated.  - A1C in 3 months. Discussed that if DM not controlled we need to f/u Q3m, Q6m if controlled.  - Annual labs due in May.  - Prefers eye exam with optometry   - POCT  A1C  - Comp Metabolic Panel; Future  - Lipid Profile; Future  - CBC WITH DIFFERENTIAL; Future  - MICROALBUMIN CREAT RATIO URINE; Future  - Empagliflozin (JARDIANCE) 10 MG Tab tablet; Take 1 Tablet by mouth every day.  Dispense: 90 Tablet; Refill: 3    2. MARK (obstructive sleep apnea)  Chronic, uncontrolled. He has been advised  to consult with a sleep specialist to adjust his CPAP settings or explore alternative treatment options.   - Referral to Pulmonary and Sleep Medicine    3. Low libido  He reports fatigue, low libido and erectile dysfunction. He has been informed that untreated sleep apnea can contribute to low testosterone. Plan pending results.   - Testosterone, Free & Total, Adult Male (w/SHBG); Future  - TSH WITH REFLEX TO FT4; Future    Return in about 3 months (around 6/18/2025), or if symptoms worsen or fail to improve.    Please note that this dictation was created using voice recognition software. I have made every reasonable attempt to correct obvious errors, but I expect that there are errors of grammar and possibly content that I did not discover before finalizing the note.

## 2025-03-18 NOTE — PATIENT INSTRUCTIONS
fasting labs due in May (early for testosterone if you [id number])    Ideal glucose ranges: fasting (8-10 hours) , random (1-2 hours after eating) <180. If glucose <70, we need to adjust your medication to prevent continuing low blood sugar.    Aim for goal of 150 minutes a week of moderate intensity exercise (ex 30 minutes 5 times a week)    Make an eye appointment (retinopathy exam, annual)

## 2025-03-20 ENCOUNTER — HOSPITAL ENCOUNTER (OUTPATIENT)
Dept: LAB | Facility: MEDICAL CENTER | Age: 45
End: 2025-03-20
Attending: STUDENT IN AN ORGANIZED HEALTH CARE EDUCATION/TRAINING PROGRAM
Payer: COMMERCIAL

## 2025-03-20 DIAGNOSIS — R68.82 LOW LIBIDO: ICD-10-CM

## 2025-03-20 DIAGNOSIS — E11.69 TYPE 2 DIABETES MELLITUS WITH OTHER SPECIFIED COMPLICATION, WITHOUT LONG-TERM CURRENT USE OF INSULIN (HCC): ICD-10-CM

## 2025-03-20 LAB
ALBUMIN SERPL BCP-MCNC: 4.2 G/DL (ref 3.2–4.9)
ALBUMIN/GLOB SERPL: 1.4 G/DL
ALP SERPL-CCNC: 85 U/L (ref 30–99)
ALT SERPL-CCNC: 22 U/L (ref 2–50)
ANION GAP SERPL CALC-SCNC: 11 MMOL/L (ref 7–16)
AST SERPL-CCNC: 21 U/L (ref 12–45)
BASOPHILS # BLD AUTO: 1.1 % (ref 0–1.8)
BASOPHILS # BLD: 0.06 K/UL (ref 0–0.12)
BILIRUB SERPL-MCNC: 0.7 MG/DL (ref 0.1–1.5)
BUN SERPL-MCNC: 24 MG/DL (ref 8–22)
CALCIUM ALBUM COR SERPL-MCNC: 8.8 MG/DL (ref 8.5–10.5)
CALCIUM SERPL-MCNC: 9 MG/DL (ref 8.5–10.5)
CHLORIDE SERPL-SCNC: 104 MMOL/L (ref 96–112)
CHOLEST SERPL-MCNC: 157 MG/DL (ref 100–199)
CO2 SERPL-SCNC: 22 MMOL/L (ref 20–33)
CREAT SERPL-MCNC: 1.04 MG/DL (ref 0.5–1.4)
CREAT UR-MCNC: 269 MG/DL
EOSINOPHIL # BLD AUTO: 0.17 K/UL (ref 0–0.51)
EOSINOPHIL NFR BLD: 3.1 % (ref 0–6.9)
ERYTHROCYTE [DISTWIDTH] IN BLOOD BY AUTOMATED COUNT: 42.5 FL (ref 35.9–50)
FASTING STATUS PATIENT QL REPORTED: NORMAL
GFR SERPLBLD CREATININE-BSD FMLA CKD-EPI: 91 ML/MIN/1.73 M 2
GLOBULIN SER CALC-MCNC: 3.1 G/DL (ref 1.9–3.5)
GLUCOSE SERPL-MCNC: 200 MG/DL (ref 65–99)
HCT VFR BLD AUTO: 44.9 % (ref 42–52)
HDLC SERPL-MCNC: 53 MG/DL
HGB BLD-MCNC: 15.7 G/DL (ref 14–18)
IMM GRANULOCYTES # BLD AUTO: 0.03 K/UL (ref 0–0.11)
IMM GRANULOCYTES NFR BLD AUTO: 0.5 % (ref 0–0.9)
LDLC SERPL CALC-MCNC: 77 MG/DL
LYMPHOCYTES # BLD AUTO: 2.32 K/UL (ref 1–4.8)
LYMPHOCYTES NFR BLD: 41.8 % (ref 22–41)
MCH RBC QN AUTO: 32.7 PG (ref 27–33)
MCHC RBC AUTO-ENTMCNC: 35 G/DL (ref 32.3–36.5)
MCV RBC AUTO: 93.5 FL (ref 81.4–97.8)
MICROALBUMIN UR-MCNC: 2.4 MG/DL
MICROALBUMIN/CREAT UR: 9 MG/G (ref 0–30)
MONOCYTES # BLD AUTO: 0.52 K/UL (ref 0–0.85)
MONOCYTES NFR BLD AUTO: 9.4 % (ref 0–13.4)
NEUTROPHILS # BLD AUTO: 2.45 K/UL (ref 1.82–7.42)
NEUTROPHILS NFR BLD: 44.1 % (ref 44–72)
NRBC # BLD AUTO: 0 K/UL
NRBC BLD-RTO: 0 /100 WBC (ref 0–0.2)
PLATELET # BLD AUTO: 213 K/UL (ref 164–446)
PMV BLD AUTO: 9.9 FL (ref 9–12.9)
POTASSIUM SERPL-SCNC: 4.3 MMOL/L (ref 3.6–5.5)
PROT SERPL-MCNC: 7.3 G/DL (ref 6–8.2)
RBC # BLD AUTO: 4.8 M/UL (ref 4.7–6.1)
SODIUM SERPL-SCNC: 137 MMOL/L (ref 135–145)
TRIGL SERPL-MCNC: 133 MG/DL (ref 0–149)
TSH SERPL DL<=0.005 MIU/L-ACNC: 0.39 UIU/ML (ref 0.38–5.33)
WBC # BLD AUTO: 5.6 K/UL (ref 4.8–10.8)

## 2025-03-20 PROCEDURE — 80061 LIPID PANEL: CPT

## 2025-03-20 PROCEDURE — 82570 ASSAY OF URINE CREATININE: CPT

## 2025-03-20 PROCEDURE — 85025 COMPLETE CBC W/AUTO DIFF WBC: CPT

## 2025-03-20 PROCEDURE — 80053 COMPREHEN METABOLIC PANEL: CPT

## 2025-03-20 PROCEDURE — 84270 ASSAY OF SEX HORMONE GLOBUL: CPT

## 2025-03-20 PROCEDURE — 36415 COLL VENOUS BLD VENIPUNCTURE: CPT

## 2025-03-20 PROCEDURE — 84443 ASSAY THYROID STIM HORMONE: CPT

## 2025-03-20 PROCEDURE — 84403 ASSAY OF TOTAL TESTOSTERONE: CPT

## 2025-03-20 PROCEDURE — 82043 UR ALBUMIN QUANTITATIVE: CPT

## 2025-03-20 PROCEDURE — 84402 ASSAY OF FREE TESTOSTERONE: CPT

## 2025-03-23 LAB
SHBG SERPL-SCNC: 45 NMOL/L (ref 17–56)
TESTOST FREE MFR SERPL: 1.8 % (ref 1.6–2.9)
TESTOST FREE SERPL-MCNC: 156 PG/ML (ref 47–244)
TESTOST SERPL-MCNC: 889 NG/DL (ref 300–890)

## 2025-03-24 NOTE — Clinical Note
REFERRAL APPROVAL NOTICE         Sent on March 24, 2025                   Soham Newman  [address]  [address]  [place] [address]                   Dear Mr. Newman,    After a careful review of the medical information and benefit coverage, [facility] has processed your referral. See below for additional details.    If applicable, you must be actively enrolled with your insurance for coverage of the authorized service. If you have any questions regarding your coverage, please contact your insurance directly.    REFERRAL INFORMATION   Referral #:  [id number]  Referred-To Department    Referred-By Provider:  Pulmonary and Sleep Medicine    Heena Roper D.O.   Pulmonary/sleep [facility]      [address]  [address]  [place] [id number] [phone] [address]  [place] [address] [phone]    Referral Start Date:  03/18/2025  Referral End Date:   03/18/2026           SCHEDULING  If you do not already have an appointment, please call [phone] to make an appointment.   MORE INFORMATION  As a reminder, [facility] ownership has changed, meaning this location is now owned and operated by [facility]. As such, we want to clarify that our patients should expect to receive two separate bills for the services received at [facility] - one representing the [facility] facility fees as the owner of the establishment, and the other to represent the physician's services and subsequent fees. You can speak with your insurance carrier for a pricing estimate by calling the customer service number on the back of your card and ask about charges for a hospital outpatient visit.  If you do not already have a [employer] account, sign up at: [email or web address]  You can access your medical information, make appointments, see lab  results, billing information, and more.  If you have questions regarding this referral, please contact  the [facility] department at:             [phone]. Monday - Friday 7:30AM - 5:00PM.      Sincerely,  [facility]

## 2025-03-26 ENCOUNTER — OFFICE VISIT (OUTPATIENT)
Dept: SLEEP MEDICINE | Facility: MEDICAL CENTER | Age: 45
End: 2025-03-26
Payer: COMMERCIAL

## 2025-03-26 VITALS
DIASTOLIC BLOOD PRESSURE: 82 MMHG | OXYGEN SATURATION: 99 % | HEART RATE: 80 BPM | BODY MASS INDEX: 31.15 KG/M2 | RESPIRATION RATE: 16 BRPM | WEIGHT: 230 LBS | HEIGHT: 72 IN | SYSTOLIC BLOOD PRESSURE: 132 MMHG

## 2025-03-26 DIAGNOSIS — G47.33 OSA (OBSTRUCTIVE SLEEP APNEA): ICD-10-CM

## 2025-03-26 PROCEDURE — 3075F SYST BP GE 130 - 139MM HG: CPT

## 2025-03-26 PROCEDURE — 99212 OFFICE O/P EST SF 10 MIN: CPT

## 2025-03-26 PROCEDURE — 99213 OFFICE O/P EST LOW 20 MIN: CPT

## 2025-03-26 PROCEDURE — 3079F DIAST BP 80-89 MM HG: CPT

## 2025-03-26 ASSESSMENT — FIBROSIS 4 INDEX: FIB4 SCORE: 0.9

## 2025-03-26 NOTE — PROGRESS NOTES
[facility] Follow-up Visit    CC:   Chief Complaint   Patient presents with    Follow-Up     Last Office Visit 2/1/2023 with              HPI:    Soham Newman is a 45 y.o.male present today for ongoing management of MARK. Patient was last seen by sleep medicine 2/13/23 by Dr. Villar. The primary reason for today's visit is to discuss ongoing sleep concerns .  Initially diagnosed with sleep apnea in 2022 via home testing.  Patient was started on CPAP therapy however it appears he did not use it for very long.  Patient reported that he would wake up gasping for air and therefore stopped using the machine.  Patient does not appear to have return for follow-up at any point after initiating therapy to discuss concerns.  Patient was recommended to be evaluated for PAP versus alternative therapy for sleep apnea due to concerns of daytime sleepiness and fatigue reported to his PCP.  In the past patient was also bothered by the mask he was using which was a fullface mask.    Today patient asked about alternatives to PAP therapy.        Sleep History  12/20/22 - HST LUCY 32.3, keon was 82% and mean O2 sat was 90% and baseline O2 at 93 %. O2 sat was below 88% for 54 min of the flow evaluation time. Rec: CPAP titration study vs Auto CPAP trial.     Patient Active Problem List    Diagnosis Date Noted    Low libido 03/18/2025    Trigger finger of right thumb 05/15/2024    Thumb joint stiffness 05/15/2024    BMI 30.0-30.9,adult 02/15/2024    MARK (obstructive sleep apnea) 02/15/2024    Coccyx pain 09/26/2023    Callus of foot 10/06/2022    Snoring 04/12/2022    Excessive daytime sleepiness 04/12/2022    Dry skin 01/11/2022    Erectile dysfunction 11/03/2021    Type 2 diabetes mellitus with other specified complication (HCC) 01/13/2021    Acute pain of right knee 01/13/2021       Past Medical History:   Diagnosis Date    Change of voice 09/26/2023    Cold     Diabetes 12/01/2009    Diabetes 01/01/2010     Dr. Irizarry- type 2    Diabetes (HCC)     Globus sensation 9/26/2023    Heart burn     Seizure (HCC)     as an infant        Past Surgical History:   Procedure Laterality Date    CIRCUMCISION ADULT  1/12/2010    Performed by RYAN KRUEGER at [facility]    OTHER  2010    adult circumcision       Family History   Problem Relation Age of Onset    Diabetes Paternal Grandfather     Diabetes Mother     Diabetes Father     Diabetes Brother     Diabetes Brother     Diabetes Maternal Grandmother        Current Outpatient Medications   Medication Sig Dispense Refill    Empagliflozin (JARDIANCE) 10 MG Tab tablet Take 1 Tablet by mouth every day. 90 Tablet 3    mupirocin (BACTROBAN) 2 % Ointment Apply 1 Application topically 2 times a day. 30 g 0    Dulaglutide (TRULICITY) 4.5 MG/0.5ML Solution Pen-injector Inject 0.5 mL under the skin every 7 days. 6 mL 3    sildenafil citrate (VIAGRA) 100 MG tablet Take 1 Tablet by mouth 1 time a day as needed for Erectile Dysfunction (max 100mg/day (1-4 hours prior to intercourse)). 8 Tablet 11    Multiple Vitamin (MULTIVITAMIN ADULT) Tab Take  by mouth every day.      Blood Glucose Test Strips Test strips order: Test strips for Abbott Freestyle Lite meter. Sig: Use twice daily and as needed for signs/symptoms of high or low blood sugar #100 RF x 3 100 Strip 3    FREESTYLE LITE strip       Blood Glucose Monitoring Suppl (FREESTYLE LITE) Device       glucose monitoring kit (FREESTYLE) monitoring kit Check sugars daily 1 Kit 0    Lancets Lancets order: Lancets for Glucometer ordered meter. Sig: Check sugars once a day. #100 RF x 3 100 Each 11     No current facility-administered medications for this visit.        ALLERGIES: Patient has no known allergies.    ROS  Constitutional: Denies fevers, Denies weight changes  Ears/Nose/Throat/Mouth: Denies nasal congestion or sore throat   Cardiovascular: Denies chest pain  Respiratory: Denies shortness of breath, Denies  cough  Gastrointestinal/Hepatic: Denies nausea, vomiting  Sleep: see HPI      PHYSICAL EXAM  /82 (BP Location: Left arm, Patient Position: Sitting, BP Cuff Size: Adult)   Pulse 80   Resp 16   Ht 1.829 m (6') Comment: per patient  Wt 104 kg (230 lb) Comment: Per patient  SpO2 99%   BMI 31.19 kg/m²   Constitutional: Alert, no distress, well-groomed.  Skin: Warm, dry, good turgor, no rashes in visible areas.  Eye: Equal, round and reactive, conjunctiva clear, lids normal.  ENMT: Lips without lesions, good dentition, moist mucous membranes.  Neck: Trachea midline, no masses, no thyromegaly.  Respiratory: Unlabored respiratory effort, no cough.  MSK: Normal gait, moves all extremities.  Neuro: Grossly non-focal.   Psych: Alert and oriented x3, normal affect and mood.      Medical Decision Making   Assessment and Plan  The medical record was reviewed including home sleep apnea tests.    Obstructive sleep apnea  - Other treatment for sleep apnea includes oral appliance therapy, nasopharyngeal reconstructive surgery, EPAP device such as Bongo Rx, positional therapy, or other behavior modifications (weight loss, alcohol cessation, avoiding sedating substances/medications).  Discussed that these are not recommended in his case since his MARK was severe. Discussed pathway for inspire including ENT initial evaluation followed by [facility], obtaining updated PSG, device implantation followed by healing period prior to activation.  Discussed that increasing device settings to achieve optimal outcomes takes place over multiple months with close follow-up and fine-tuning sleep studies. Discussed that Inspire may not completely eliminate MARK and related events.  Patient was not aware that inspire required surgery and is reluctant to consider this option at this time.  -Discussed that there are many options for troubleshooting issues with the PAP machine and that his reports of waking up feeling that he cannot breathe may be  related to the pressure.  I accessed patient's device remotely and increased the pressure setting.  I also provided the patient with a nasal cushion fit kit did not like wearing a fullface mask.  Advised patient to try using the machine and using graded exposure if necessary to acclimate.  Recommend close follow-up in 1 month to determine if increased pressure settings as well as different mask may get therapy.  Advised patient to reach out for significant issues as opposed to stopping therapy without follow-up.    MARK/PAP EDUCATION:  - Clean equipment frequently, and get new mask and supplies as allowed by insurance and DME.   - Avoid driving a motor vehicle when drowsy  - Patients with MARK are at increased risk of cardiovascular disease including coronary artery disease, systemic arterial hypertension, pulmonary arterial hypertension, cardiac arrythmias, and stroke. Positive airway pressure will favorably impact many of the adverse conditions and effects provoked by MARK.       Return in about 1 month (around 4/26/2025), or if symptoms worsen or fail to improve.   - Recommend an earlier appointment, if significant treatment barriers develop.  - Patient to follow up with the appropriate healthcare practitioners for all other medical problems and issues.    Please note portions of this record was created using voice recognition software. I have made every reasonable attempt to correct obvious errors, but I expect that there are errors of grammar and possibly content I did not discover before finalizing the note.

## 2025-04-07 ENCOUNTER — RESULTS FOLLOW-UP (OUTPATIENT)
Dept: MEDICAL GROUP | Facility: LAB | Age: 45
End: 2025-04-07

## 2025-04-30 ENCOUNTER — APPOINTMENT (OUTPATIENT)
Dept: SLEEP MEDICINE | Facility: MEDICAL CENTER | Age: 45
End: 2025-04-30
Payer: COMMERCIAL

## 2025-06-01 DIAGNOSIS — E11.65 TYPE 2 DIABETES MELLITUS WITH HYPERGLYCEMIA, WITHOUT LONG-TERM CURRENT USE OF INSULIN (HCC): ICD-10-CM

## 2025-06-02 RX ORDER — DULAGLUTIDE 4.5 MG/.5ML
0.5 INJECTION, SOLUTION SUBCUTANEOUS
Qty: 6 ML | Refills: 3 | Status: SHIPPED | OUTPATIENT
Start: 2025-06-02

## 2025-06-24 ENCOUNTER — RESULTS FOLLOW-UP (OUTPATIENT)
Dept: MEDICAL GROUP | Facility: LAB | Age: 45
End: 2025-06-24

## 2025-06-24 ENCOUNTER — OFFICE VISIT (OUTPATIENT)
Dept: MEDICAL GROUP | Facility: LAB | Age: 45
End: 2025-06-24
Payer: COMMERCIAL

## 2025-06-24 VITALS
RESPIRATION RATE: 16 BRPM | WEIGHT: 230.7 LBS | DIASTOLIC BLOOD PRESSURE: 62 MMHG | OXYGEN SATURATION: 98 % | SYSTOLIC BLOOD PRESSURE: 128 MMHG | HEIGHT: 72 IN | HEART RATE: 86 BPM | BODY MASS INDEX: 31.25 KG/M2 | TEMPERATURE: 98.1 F

## 2025-06-24 DIAGNOSIS — E11.69 TYPE 2 DIABETES MELLITUS WITH OTHER SPECIFIED COMPLICATION, WITHOUT LONG-TERM CURRENT USE OF INSULIN (HCC): Primary | ICD-10-CM

## 2025-06-24 LAB
HBA1C MFR BLD: 7 % (ref ?–5.8)
POCT INT CON NEG: NEGATIVE
POCT INT CON POS: POSITIVE

## 2025-06-24 PROCEDURE — 3074F SYST BP LT 130 MM HG: CPT | Performed by: STUDENT IN AN ORGANIZED HEALTH CARE EDUCATION/TRAINING PROGRAM

## 2025-06-24 PROCEDURE — 83036 HEMOGLOBIN GLYCOSYLATED A1C: CPT | Performed by: STUDENT IN AN ORGANIZED HEALTH CARE EDUCATION/TRAINING PROGRAM

## 2025-06-24 PROCEDURE — 3078F DIAST BP <80 MM HG: CPT | Performed by: STUDENT IN AN ORGANIZED HEALTH CARE EDUCATION/TRAINING PROGRAM

## 2025-06-24 PROCEDURE — 99213 OFFICE O/P EST LOW 20 MIN: CPT | Performed by: STUDENT IN AN ORGANIZED HEALTH CARE EDUCATION/TRAINING PROGRAM

## 2025-06-24 ASSESSMENT — ENCOUNTER SYMPTOMS
VOMITING: 0
WEIGHT LOSS: 1
COUGH: 0
DIARRHEA: 0
CHILLS: 0
FEVER: 0
SHORTNESS OF BREATH: 0
NAUSEA: 0
ABDOMINAL PAIN: 0

## 2025-06-24 ASSESSMENT — FIBROSIS 4 INDEX: FIB4 SCORE: 0.9

## 2025-06-24 NOTE — PROGRESS NOTES
Verbal consent was acquired by the patient to use [employer] ambient listening note generation during this visit Yes.    Subjective:     Chief Complaint   Patient presents with    Diabetes Follow-up     HPI:     History of Present Illness  The patient is a 45-year-old male who presents for follow-up on his diabetes.    He has been unable to obtain Jardiance from [employer] due to a lack of prescription receipt. His A1c level has decreased from 7.7 to 7, which he attributes to an increase in physical activity. He has been maintaining a regular intake of multivitamins and mental health supplements. He recently received a new shipment of Trulicity, sufficient for a 3-month period, administered once weekly. He has also experienced weight loss.    Review of Systems   Constitutional:  Positive for weight loss. Negative for chills, fever and malaise/fatigue.   Respiratory:  Negative for cough and shortness of breath.    Cardiovascular:  Negative for chest pain.   Gastrointestinal:  Negative for abdominal pain, diarrhea, nausea and vomiting.   Skin:  Negative for rash.       Objective:     Exam:  /62 (BP Location: Left arm, Patient Position: Sitting, BP Cuff Size: Adult)   Pulse 86   Temp 36.7 °C (98.1 °F) (Temporal)   Resp 16   Ht 1.829 m (6')   Wt 105 kg (230 lb 11.2 oz)   SpO2 98%   BMI 31.29 kg/m²  Body mass index is 31.29 kg/m².    Physical Exam  Vitals reviewed.   Constitutional:       Appearance: Normal appearance.   HENT:      Head: Normocephalic and atraumatic.      Mouth/Throat:      Mouth: Mucous membranes are moist.   Pulmonary:      Effort: Pulmonary effort is normal.   Neurological:      General: No focal deficit present.      Mental Status: He is alert and oriented to person, place, and time.   Psychiatric:         Mood and Affect: Mood normal.         Behavior: Behavior normal.         Thought Content: Thought content normal.       Reviewed most recent/relevant labs.    Assessment & Plan:     45  y.o. male with the following -     1. Type 2 diabetes mellitus with other specified complication, without long-term current use of insulin ([facility])  - POCT  A1C  - Empagliflozin (JARDIANCE) 10 MG Tab tablet; Take 1 Tablet by mouth every day.  Dispense: 90 Tablet; Refill: 3    Assessment & Plan  1. Diabetes Mellitus.  Chronic, improved.  - A1c level has decreased from 7.7 to 7, attributed to increased physical activity. Never received Jardiance when prescribed prior.  Given A1c not less than 7 we will go ahead and start as discussed previously.  - Jardiance 10 mg prescribed; ensure adequate hydration during initial weeks of therapy and report any side effects. Continue Trulicity 4.5mg weekly. A1c levels to be monitored in 3 months, with potential dosage increase to 25 mg if A1c remains above 7.      Return in about 3 months (around 10/2/2025), or if symptoms worsen or fail to improve, for Diabetes.    Please note that this dictation was created using voice recognition software. I have made every reasonable attempt to correct obvious errors, but I expect that there are errors of grammar and possibly content that I did not discover before finalizing the note.